# Patient Record
Sex: MALE | Race: WHITE | NOT HISPANIC OR LATINO | Employment: FULL TIME | URBAN - METROPOLITAN AREA
[De-identification: names, ages, dates, MRNs, and addresses within clinical notes are randomized per-mention and may not be internally consistent; named-entity substitution may affect disease eponyms.]

---

## 2022-09-26 ENCOUNTER — OCCUPATIONAL HEALTH (OUTPATIENT)
Dept: URGENT CARE | Facility: CLINIC | Age: 57
End: 2022-09-26

## 2022-09-26 DIAGNOSIS — Z13.9 ENCOUNTER FOR SCREENING: Primary | ICD-10-CM

## 2022-09-26 LAB
CTP QC/QA: YES
POC 10 PANEL DRUG SCREEN: NEGATIVE

## 2022-09-26 PROCEDURE — 80305 DRUG TEST PRSMV DIR OPT OBS: CPT | Mod: S$GLB,,, | Performed by: EMERGENCY MEDICINE

## 2022-09-26 PROCEDURE — 80305 POCT RAPID DRUG SCREEN 10 PANEL: ICD-10-PCS | Mod: S$GLB,,, | Performed by: EMERGENCY MEDICINE

## 2023-11-03 ENCOUNTER — PATIENT MESSAGE (OUTPATIENT)
Dept: UROLOGY | Facility: CLINIC | Age: 58
End: 2023-11-03
Payer: MEDICARE

## 2023-11-15 ENCOUNTER — OFFICE VISIT (OUTPATIENT)
Dept: UROLOGY | Facility: CLINIC | Age: 58
End: 2023-11-15
Payer: MEDICARE

## 2023-11-15 ENCOUNTER — HOSPITAL ENCOUNTER (OUTPATIENT)
Dept: RADIOLOGY | Facility: HOSPITAL | Age: 58
Discharge: HOME | End: 2023-11-15
Attending: UROLOGY
Payer: MEDICARE

## 2023-11-15 VITALS — BODY MASS INDEX: 23.47 KG/M2 | WEIGHT: 158.5 LBS | HEIGHT: 69 IN

## 2023-11-15 DIAGNOSIS — N13.8 ENLARGED PROSTATE WITH URINARY OBSTRUCTION: ICD-10-CM

## 2023-11-15 DIAGNOSIS — R10.9 FLANK PAIN: ICD-10-CM

## 2023-11-15 DIAGNOSIS — R97.20 ELEVATED PSA: Primary | ICD-10-CM

## 2023-11-15 DIAGNOSIS — N40.1 ENLARGED PROSTATE WITH URINARY OBSTRUCTION: ICD-10-CM

## 2023-11-15 LAB
BILIRUBIN, UA POC OHS: NEGATIVE
BLOOD, UA POC OHS: NEGATIVE
CLARITY, UA POC OHS: CLEAR
COLOR, UA POC OHS: YELLOW
GLUCOSE, UA POC OHS: NEGATIVE
KETONES, UA POC OHS: NEGATIVE
LEUKOCYTES, UA POC OHS: NEGATIVE
NITRITE, UA POC OHS: NEGATIVE
PH, UA POC OHS: 6.5
PROTEIN, UA POC OHS: NEGATIVE
SPECIFIC GRAVITY, UA POC OHS: 1.01
UROBILINOGEN, UA POC OHS: 0.2

## 2023-11-15 PROCEDURE — 81003 URINALYSIS AUTO W/O SCOPE: CPT | Mod: PBBFAC,PO | Performed by: UROLOGY

## 2023-11-15 PROCEDURE — 99999PBSHW POCT URINALYSIS(INSTRUMENT): ICD-10-PCS | Mod: PBBFAC,,,

## 2023-11-15 PROCEDURE — 99213 OFFICE O/P EST LOW 20 MIN: CPT | Mod: PBBFAC,25,PO | Performed by: UROLOGY

## 2023-11-15 PROCEDURE — 76770 US EXAM ABDO BACK WALL COMP: CPT | Mod: TC,PO

## 2023-11-15 PROCEDURE — 99999PBSHW POCT URINALYSIS(INSTRUMENT): Mod: PBBFAC,,,

## 2023-11-15 PROCEDURE — 99204 OFFICE O/P NEW MOD 45 MIN: CPT | Mod: S$PBB,,, | Performed by: UROLOGY

## 2023-11-15 PROCEDURE — 76770 US RETROPERITONEAL COMPLETE: ICD-10-PCS | Mod: 26,,, | Performed by: RADIOLOGY

## 2023-11-15 PROCEDURE — 99999 PR PBB SHADOW E&M-EST. PATIENT-LVL III: CPT | Mod: PBBFAC,,, | Performed by: UROLOGY

## 2023-11-15 PROCEDURE — 99999 PR PBB SHADOW E&M-EST. PATIENT-LVL III: ICD-10-PCS | Mod: PBBFAC,,, | Performed by: UROLOGY

## 2023-11-15 PROCEDURE — 99204 PR OFFICE/OUTPT VISIT, NEW, LEVL IV, 45-59 MIN: ICD-10-PCS | Mod: S$PBB,,, | Performed by: UROLOGY

## 2023-11-15 PROCEDURE — 76770 US EXAM ABDO BACK WALL COMP: CPT | Mod: 26,,, | Performed by: RADIOLOGY

## 2023-11-15 RX ORDER — TAMSULOSIN HYDROCHLORIDE 0.4 MG/1
1 CAPSULE ORAL DAILY
COMMUNITY
Start: 2023-10-27

## 2023-11-15 RX ORDER — PHENELZINE SULFATE 15 MG/1
15 TABLET ORAL EVERY OTHER DAY
COMMUNITY
Start: 2023-11-03

## 2023-11-15 NOTE — PROGRESS NOTES
Subjective:       Patient ID: Ronald Martinez is a 58 y.o. male.    Chief Complaint: Elevated PSA    HPI    58-year-old with elevated PSA.  His PSA is increased to 64.9.  He says in 2019 his last PSA was in the 2-3 range.  He has no family history of prostate cancer.  He has moderate obstructive urinary symptoms including urinary frequency and weak flow.  He denies hematuria and dysuria.  He was recently given a prescription for Flomax which he has not yet started.  He has had blood in his semen on occasion.  He has no previous urinary tract infections.  Urinalysis today is clear.  He has been having left-sided flank pain for the last 2 weeks.  He denies associated nausea.  He is concerned about kidney stones.  He is not had any previous stones but 2 brothers have stones.  We discussed screening PSA and its limitation.  We discussed the possibility of prostate cancer.  We discussed prostate needle biopsy.  We discussed prostate MRI.  Urine dipstick shows negative for all components.      Prostate Specific Antigen   Latest Ref Rng 0.00 - 4.00 ng/mL   10/20/2023 64.9 (H)      No past medical history on file.    No past surgical history on file.      Current Outpatient Medications:     phenelzine (NARDIL) 15 mg tablet, Take 15 mg by mouth every other day., Disp: , Rfl:     tamsulosin (FLOMAX) 0.4 mg Cap, Take 1 capsule by mouth once daily., Disp: , Rfl:       Review of Systems   Constitutional:  Negative for fever.   Genitourinary:  Negative for dysuria and hematuria.       Objective:      Physical Exam  Vitals reviewed.   Constitutional:       Appearance: He is well-developed.   Pulmonary:      Effort: Pulmonary effort is normal.   Genitourinary:     Penis: Normal.       Testes: Normal.      Epididymis:      Right: Normal.      Left: Normal.      Prostate: Enlarged (>60g, irregular.). No nodules present.   Skin:     Findings: No rash.   Neurological:      Mental Status: He is alert and oriented to person, place, and time.          Assessment:       1. Elevated PSA    2. Flank pain    3. Enlarged prostate with urinary obstruction        Plan:       Elevated PSA  -     POCT Urinalysis(Instrument)  -     MRI Prostate W W/O Contrast; Future; Expected date: 11/15/2023    Flank pain  -     US Retroperitoneal Complete; Future; Expected date: 11/15/2023    Enlarged prostate with urinary obstruction      Kidney ultrasound was reviewed.  There is no stones or obstruction.  Prostate is enlarged at 58 cc.

## 2024-09-05 DIAGNOSIS — R97.20 ELEVATED PSA: Primary | ICD-10-CM

## 2024-09-06 ENCOUNTER — HOSPITAL ENCOUNTER (OUTPATIENT)
Dept: RADIOLOGY | Facility: HOSPITAL | Age: 59
Discharge: HOME | End: 2024-09-06
Attending: UROLOGY
Payer: MEDICARE

## 2024-09-06 DIAGNOSIS — R97.20 ELEVATED PSA: ICD-10-CM

## 2024-09-06 PROCEDURE — 72197 MRI PELVIS W/O & W/DYE: CPT | Mod: TC,PO

## 2024-09-06 PROCEDURE — 72197 MRI PELVIS W/O & W/DYE: CPT | Mod: 26,,, | Performed by: STUDENT IN AN ORGANIZED HEALTH CARE EDUCATION/TRAINING PROGRAM

## 2024-09-06 RX ORDER — GADOBUTROL 604.72 MG/ML
7 INJECTION INTRAVENOUS
Status: DISCONTINUED | OUTPATIENT
Start: 2024-09-06 | End: 2024-09-07 | Stop reason: HOSPADM

## 2024-09-09 ENCOUNTER — TELEPHONE (OUTPATIENT)
Dept: UROLOGY | Facility: CLINIC | Age: 59
End: 2024-09-09
Payer: MEDICARE

## 2024-09-09 NOTE — TELEPHONE ENCOUNTER
----- Message from Marianela Adams sent at 9/9/2024  1:32 PM CDT -----  Contact: self  Type:  Needs Medical Advice    Who Called: self  Symptoms (please be specific): pt needs a CPT code for upcoming Pet scan   Would the patient rather a call back or a response via [facility]? call  Best Call Back Number: [phone] (home)     Additional Information: please advise and thank you.

## 2024-09-09 NOTE — TELEPHONE ENCOUNTER
Called pt back in regards to previous message about a PET scan. PT was asked how can we assist him and pt got loud and stated well don't you have the message in front of you. Pt was asked to not be disrespectful to staff as they are trying to assist pt with what is needed. So pt was asked again as to what he is needing as pt is not scheduled for a PET scan and that is normally where code is linked too. PT got loud with staff again and was being disrespectful so call was disconnected.

## 2024-09-10 ENCOUNTER — PATIENT MESSAGE (OUTPATIENT)
Dept: UROLOGY | Facility: CLINIC | Age: 59
End: 2024-09-10
Payer: MEDICARE

## 2024-09-10 RX ORDER — LEVOFLOXACIN 500 MG/1
500 TABLET, FILM COATED ORAL DAILY
Qty: 3 TABLET | Refills: 0 | Status: SHIPPED | OUTPATIENT
Start: 2024-09-10

## 2024-09-13 DIAGNOSIS — R97.20 ELEVATED PSA: Primary | ICD-10-CM

## 2024-09-16 ENCOUNTER — OFFICE VISIT (OUTPATIENT)
Dept: UROLOGY | Facility: CLINIC | Age: 59
End: 2024-09-16
Payer: MEDICARE

## 2024-09-16 DIAGNOSIS — R97.20 ELEVATED PSA: Primary | ICD-10-CM

## 2024-09-16 PROCEDURE — 99214 OFFICE O/P EST MOD 30 MIN: CPT | Mod: 95,,, | Performed by: UROLOGY

## 2024-09-16 NOTE — PROGRESS NOTES
The patient location is:  Home  The chief complaint leading to consultation is:  Elevated PSA    Visit type: audiovisual    Face to Face time with patient: 10  20 minutes of total time spent on the encounter, which includes face to face time and non-face to face time preparing to see the patient (eg, review of tests), Obtaining and/or reviewing separately obtained history, Documenting clinical information in the electronic or other health record, Independently interpreting results (not separately reported) and communicating results to the patient/family/caregiver, or Care coordination (not separately reported).       Each patient to whom he or she provides medical services by telemedicine is:  (1) informed of the relationship between the physician and patient and the respective role of any other health care provider with respect to management of the patient; and (2) notified that he or she may decline to receive medical services by telemedicine and may withdraw from such care at any time.      Subjective:       Patient ID: Ronald Martinez is a 59 y.o. male.    Chief Complaint: No chief complaint on file.    HPI    59-year-old with elevated PSA of 64.9.  He underwent prostate MRI which noted a PiRADS 5 lesion.  Prostate volume was 57 mL.  MRI also noted concerning lymphadenopathy as well as bony lesions in the pubic symphysis and pubic rami concerning for osseous metastasis.  The lesion appears to extend to the seminal vesicles as well as into the bladder base.  I recommend prostate needle biopsy using Uronav guidance.  I explained the procedure in detail including the risk of infection.     Prostate Specific Antigen   Latest Ref Rng 0.00 - 4.00 ng/mL   10/20/2023 64.9 (H)       Legend:  (H) High    Review of Systems   Constitutional:  Negative for fever.   Genitourinary:  Negative for dysuria and hematuria.       Objective:      Physical Exam  Constitutional:       General: He is not in acute distress.  Neurological:       Mental Status: He is alert and oriented to person, place, and time.         Assessment:       1. Elevated PSA        Plan:       Elevated PSA      We will proceed with prostate needle biopsy using Uronav it guidance as scheduled.     I personally reviewed the MRI images and made an independent interpretation of the test completed by another healthcare professional.

## 2024-09-17 ENCOUNTER — TELEPHONE (OUTPATIENT)
Dept: UROLOGY | Facility: CLINIC | Age: 59
End: 2024-09-17
Payer: MEDICARE

## 2024-09-17 RX ORDER — CITALOPRAM 10 MG/1
5 TABLET ORAL
COMMUNITY
Start: 2024-04-19

## 2024-09-20 ENCOUNTER — TELEPHONE (OUTPATIENT)
Dept: UROLOGY | Facility: CLINIC | Age: 59
End: 2024-09-20
Payer: MEDICARE

## 2024-09-20 NOTE — TELEPHONE ENCOUNTER
Spoke with Dr Mariano office, re faxed clearance in preparation for patient procedure scheduled on 9/27/24

## 2024-09-20 NOTE — TELEPHONE ENCOUNTER
----- Message from Sejal Murry MA sent at 9/20/2024  2:06 PM CDT -----  Contact: PT    ----- Message -----  From: Lola Morejon  Sent: 9/20/2024   1:31 PM CDT  To: ANDREA Haas Staff [facility]    Type:  Apoointment TIME Request    Name of Caller:PT   When is the first available appointment?PT SCHEDULED FOR 09/27/24  Would the patient rather a call back or a response via [facility]? CALL   Best Call Back Number: [phone]  Additional Information: PT NEEDS THE TIME OF HIS PROCEDURE ASAP. PT NEEDS TO [facility] A RIDE ONE WEEK IN ADVANCE.  THANK YOU

## 2024-09-20 NOTE — TELEPHONE ENCOUNTER
Spoke with patient , advised him that a nurse will call him the day before his scheduled procedure and give him an arrival time. Patient states his brother will take him so he does have a ride to procedure. Patient expressed understanding..

## 2024-09-23 ENCOUNTER — TELEPHONE (OUTPATIENT)
Dept: UROLOGY | Facility: CLINIC | Age: 59
End: 2024-09-23
Payer: MEDICARE

## 2024-09-23 NOTE — TELEPHONE ENCOUNTER
Spoke with Mercedes in Dr Mariano office , she will have Dr Mariano know about patient upcoming biopsy and fax our office clearance back .

## 2024-09-26 PROBLEM — R97.20 ELEVATED PSA: Status: ACTIVE | Noted: 2024-09-26

## 2024-10-01 DIAGNOSIS — C61 PROSTATE CANCER: Primary | ICD-10-CM

## 2024-10-04 ENCOUNTER — HOSPITAL ENCOUNTER (OUTPATIENT)
Dept: RADIOLOGY | Facility: HOSPITAL | Age: 59
Discharge: HOME | End: 2024-10-04
Attending: UROLOGY
Payer: MEDICARE

## 2024-10-04 DIAGNOSIS — C61 PROSTATE CANCER: ICD-10-CM

## 2024-10-04 PROCEDURE — 78815 PET IMAGE W/CT SKULL-THIGH: CPT | Mod: 26,PI,, | Performed by: STUDENT IN AN ORGANIZED HEALTH CARE EDUCATION/TRAINING PROGRAM

## 2024-10-04 PROCEDURE — A9595 HC PIFLUFOLASTAT F-18, DX, PER 1 MCI: HCPCS | Mod: TB,PN | Performed by: UROLOGY

## 2024-10-04 PROCEDURE — 78815 PET IMAGE W/CT SKULL-THIGH: CPT | Mod: TC,PN

## 2024-10-04 RX ADMIN — PIFLUFOLASTAT F-18 9.9 MILLICURIE: 80 INJECTION INTRAVENOUS at 03:10

## 2024-10-04 NOTE — PROGRESS NOTES
PET Imaging Questionnaire    Are you a Diabetic? Recent Blood Sugar level? No    Are you anemic? Bone Marrow Stimulation Meds? No    Have you had a CT Scan, if so when & where was your last one? No    Have you had a PET Scan, if so when & where was your last one? No    Chemotherapy or currently on Chemotherapy? No    Radiation therapy? No    Surgical History:   Past Surgical History:   Procedure Laterality Date    CYST REMOVAL      TRANSRECTAL BIOPSY OF PROSTATE WITH ULTRASOUND GUIDANCE N/A 9/27/2024    Procedure: BIOPSY, PROSTATE, RECTAL APPROACH, WITH US GUIDANCE;  Surgeon: ANDREA Haas MD;  Location: [facility];  Service: Urology;  Laterality: N/A;        Have you been fasting for at least 6 hours? No    Is there any chance you may be pregnant or breastfeeding? No    Assay: 10.0 MCi@:15:27   Injection Site:RT AC    Residual: 0.1 mCi@: 15:31   Technologist: Rory Schmid Injected:9.9 mCi

## 2024-10-07 ENCOUNTER — TELEPHONE (OUTPATIENT)
Dept: HEMATOLOGY/ONCOLOGY | Facility: CLINIC | Age: 59
End: 2024-10-07
Payer: MEDICARE

## 2024-10-07 NOTE — NURSING
Spoke with patient, appointment times adjusted to avoid long wait times.  Pt verbalized agreement to new times/date/location. All questions and concerns addressed at this time, will continue to follow.

## 2024-10-17 NOTE — PROGRESS NOTES
Subjective:       Patient ID: Ronald Martinez is a 59 y.o. male.    Chief Complaint: Prostate Cancer (Elevated PSA)    HPI    59-year-old elevated PSA of 64.9. MRI noted a PiRADS 5 lesion. Prostate volume was 57 mL. MRI also noted bony lesions concerning for metastasis in the pubic rami and pubic symphysis. He is underwent prostate needle biopsy using US guidance.  All 12 cores are positive with highest grade Lindsey 4 + 5, grade group 5.  PET scan and MRI show evidence of bony metastasis as well as metastatic pelvic lymph nodes.  He is seen today alone.  We discussed all treatment options for advanced prostate cancer.  I answered all questions to his satisfaction.      PSA  64.9  MRI volume 57 ml  Risk Group  Very High  IPSS  29      1. LEFT BASE PROSTATE CORE NEEDLE BIOPSIES:   - PROSTATIC ADENOCARCINOMA, LINDSEY SCORE 4+4=8, ISUP/WHO GRADE GROUP 4,    INVOLVING 80% (10 MM) OF ONE CORE AND 85% (14 MM) OF THE SECOND CORE.    CRIBRIFORM PATTERN AND PERINEURAL INVASION PRESENT.     2. LEFT MID-PROSTATE CORE NEEDLE BIOPSIES:   - PROSTATIC ADENOCARCINOMA, LINDSEY SCORE 4+4=8, ISUP/WHO GRADE GROUP 4,    INVOLVING 100% (20 MM) OF ONE CORE % (20 MM) OF THE SECOND CORE.    CRIBRIFORM PATTERN AND PERINEURAL INVASION PRESENT.     3. LEFT APEX PROSTATE CORE NEEDLE BIOPSIES:   - PROSTATIC ADENOCARCINOMA, LINDSEY SCORE 4+4=8, ISUP/WHO GRADE GROUP 4,    INVOLVING 65% (10 MM) OF ONE CORE AND 75% (10 MM) OF THE SECOND CORE.    CRIBRIFORM PATTERN PRESENT.     4. RIGHT BASE PROSTATE CORE NEEDLE BIOPSIES:   - PROSTATIC ADENOCARCINOMA, LINDSEY SCORE 4+5=9, ISUP/WHO GRADE GROUP 5,    INVOLVING 100% (18 MM) OFONE CORE AND 75% (14 MM) OF THE SECOND CORE.    CRIBRIFORM PATTERN AND PERINEURAL INVASION PRESENT.     5. RIGHT MID-PROSTATE CORE NEEDLE BIOPSIES:   - PROSTATIC ADENOCARCINOMA, LINDSEY SCORE 4+5=9, ISUP/WHO GRADE GROUP 5,    INVOLVING 100% (24 MM) OF ONE CORE AND 75% (15 MM) OF THE SECOND CORE.    CRIBRIFORM PATTERN PRESENT.      6. RIGHT APEX PROSTATE CORE NEEDLE BIOPSIES:   - PROSTATIC ADENOCARCINOMA, DEONNA SCORE 4+5=9, ISUP/WHO GRADE GROUP 5,    INVOLVING 85% (17 MM) OF ONE CORE   AND 25% (5 MM) OF THE SECOND CORE. CRIBRIFORM PATTERN PRESENT.     Review of Systems   Constitutional:  Negative for fever.   Genitourinary:  Negative for dysuria and hematuria.       Objective:      Physical Exam  Vitals reviewed.   Constitutional:       Appearance: He is well-developed.   Pulmonary:      Effort: Pulmonary effort is normal.   Skin:     Findings: No rash.   Neurological:      Mental Status: He is alert and oriented to person, place, and time.         Assessment:       1. Prostate cancer        Plan:       Prostate cancer      Advanced prostate cancer.  Case discussed with Dr. Hills and María.    Primary ADT,  abiraterone and prednisone along with Lupron

## 2024-10-17 NOTE — PROGRESS NOTES
PATIENT: Ronald Martinez  MRN: [id number]  DATE: 10/20/2024      Diagnosis:   1. Prostate cancer        Chief Complaint: Prostate Cancer (Elevated PSA)      Oncologic History:      Oncologic History     Oncologic Treatment     Pathology           Subjective:    Initial History: Mr. Martinez is a 59 y.o. male presets for metastatic prostate cancer.  PSA on 10/20/2023 was 64.9 ng/mL.  The patient underwent MRI prostate 09/06/2024 showing a large lesion involving the majority of the prostate gland.  The patient underwent prostate biopsy on 09/27/2024 showing prostatic adenocarcinoma Lindsey score 4+5=9 grade group 5.  Patient underwent PSMA PET-CT on 10/04/2024 showing markedly enlarged prostate gland with diffuse radiotracer uptake; metastatic right pelvic lymph node measuring 2.8 x 1.4 cm; left internal iliac node measuring 1.9 x 1.2 cm; left external iliac lymph node measuring 2.5 x 1.5 cm; infiltrative lesion in the right inferior pubic ramus extending into the pubic symphysis; nodular focus of radiotracer accumulation in the left inferior pubic ramus; punctate focus of radiotracer accumulation in the right iliac bone; nodular focus of radiotracer accumulation within the left lamina of S1.    Currently the patient endorses weight loss in a year.  The patient endorses migratory pain in the right thigh.  The patient endorses occasional abdominal pain, frequent bowel movements, frequent urination, arthritis in the hips and back.  The patient denies CP, cough, SOB, abdominal pain, nausea, vomiting, constipation, diarrhea.  The patient denies fever, chills, night sweats, weight loss, new lumps or bumps, easy bruising or bleeding.    Past Medical History:   Past Medical History:   Diagnosis Date    Cancer     prostate       Past Surgical HIstory:   Past Surgical History:   Procedure Laterality Date    CYST REMOVAL      TRANSRECTAL BIOPSY OF PROSTATE WITH ULTRASOUND GUIDANCE N/A 9/27/2024    Procedure: BIOPSY, PROSTATE, RECTAL  APPROACH, WITH US GUIDANCE;  Surgeon: ANDREA Haas MD;  Location: [facility];  Service: Urology;  Laterality: N/A;       Family History:   Family History   Problem Relation Name Age of Onset    Atrial fibrillation Mother      Deep vein thrombosis Father      Heart attack Father      Kidney nephrosis Brother         Social History:  reports that he quit smoking about 49 years ago. His smoking use included cigarettes. He started smoking about 43 years ago. He has never used smokeless tobacco. He reports that he does not currently use alcohol after a past usage of about 7.0 standard drinks of alcohol per week. He reports that he does not currently use drugs.    Allergies:  Review of patient's allergies indicates:  No Known Allergies    Medications:  Current Outpatient Medications   Medication Sig Dispense Refill    abiraterone (ZYTIGA) 500 mg Tab Take 2 tablets (1,000 mg total) by mouth once daily. 60 tablet 6    predniSONE (DELTASONE) 5 MG tablet Take 1 tablet (5 mg total) by mouth once daily. 30 tablet 6     No current facility-administered medications for this visit.       Review of Systems   Constitutional:  Positive for unexpected weight change (10 pounds in a year). Negative for appetite change, chills and fever.   HENT:  Negative for mouth sores, sore throat and trouble swallowing.    Eyes:  Negative for photophobia and visual disturbance.   Respiratory:  Negative for cough, chest tightness and shortness of breath.    Cardiovascular:  Negative for chest pain, palpitations and leg swelling.   Gastrointestinal:  Positive for abdominal pain. Negative for constipation, diarrhea, nausea and vomiting.        Frequent bowel movements   Endocrine: Negative for cold intolerance and heat intolerance.   Genitourinary:  Positive for frequency. Negative for difficulty urinating, dysuria and hematuria.   Musculoskeletal:  Positive for arthralgias (hips) and back pain. Negative for myalgias.   Skin:  Negative for color  "change and rash.   Neurological:  Negative for dizziness, light-headedness, numbness and headaches.   Hematological:  Negative for adenopathy. Does not bruise/bleed easily.   Psychiatric/Behavioral:  The patient is not nervous/anxious.        ECOG Performance Status: 1   Objective:      Vitals:   Vitals:    10/18/24 1316   BP: 137/81   BP Location: Right arm   Patient Position: Sitting   Pulse: 62   Resp: 17   Temp: 97.1 °F (36.2 °C)   TempSrc: Temporal   SpO2: 99%   Weight: 70.9 kg (156 lb 4.9 oz)   Height: 5' 8.5" (1.74 m)       Physical Exam  Constitutional:       General: He is not in acute distress.     Appearance: He is well-developed. He is not diaphoretic.   HENT:      Head: Normocephalic and atraumatic.   Eyes:      General: No scleral icterus.        Right eye: No discharge.         Left eye: No discharge.   Cardiovascular:      Rate and Rhythm: Normal rate and regular rhythm.      Heart sounds: Normal heart sounds. No murmur heard.     No friction rub. No gallop.   Pulmonary:      Effort: Pulmonary effort is normal. No respiratory distress.      Breath sounds: Normal breath sounds. No wheezing or rales.   Chest:      Chest wall: No tenderness.   Abdominal:      General: Bowel sounds are normal. There is no distension.      Palpations: Abdomen is soft. There is no mass.      Tenderness: There is no abdominal tenderness. There is no rebound.   Skin:     General: Skin is warm and dry.      Findings: No erythema or rash.   Neurological:      Mental Status: He is alert and oriented to person, place, and time.   Psychiatric:         Behavior: Behavior normal.         Laboratory Data:  Lab Visit on 10/18/2024   Component Date Value Ref Range Status    WBC 10/18/2024 5.29  3.90 - 12.70 K/uL Final    RBC 10/18/2024 4.47 (L)  4.60 - 6.20 M/uL Final    Hemoglobin 10/18/2024 13.7 (L)  14.0 - 18.0 g/dL Final    Hematocrit 10/18/2024 40.2  40.0 - 54.0 % Final    MCV 10/18/2024 90  82 - 98 fL Final    MCH 10/18/2024 " 30.6  27.0 - 31.0 pg Final    MCHC 10/18/2024 34.1  32.0 - 36.0 g/dL Final    RDW 10/18/2024 11.9  11.5 - 14.5 % Final    Platelets 10/18/2024 246  150 - 450 K/uL Final    MPV 10/18/2024 9.6  9.2 - 12.9 fL Final    Immature Granulocytes 10/18/2024 0.2  0.0 - 0.5 % Final    Gran # (ANC) 10/18/2024 3.0  1.8 - 7.7 K/uL Final    Immature Grans (Abs) 10/18/2024 0.01  0.00 - 0.04 K/uL Final    Comment: Mild elevation in immature granulocytes is non specific and   can be seen in a variety of conditions including stress response,   acute inflammation, trauma and pregnancy. Correlation with other   laboratory and clinical findings is essential.      Lymph # 10/18/2024 1.7  1.0 - 4.8 K/uL Final    Mono # 10/18/2024 0.5  0.3 - 1.0 K/uL Final    Eos # 10/18/2024 0.1  0.0 - 0.5 K/uL Final    Baso # 10/18/2024 0.04  0.00 - 0.20 K/uL Final    nRBC 10/18/2024 0  0 /100 WBC Final    Gran % 10/18/2024 56.6  38.0 - 73.0 % Final    Lymph % 10/18/2024 31.6  18.0 - 48.0 % Final    Mono % 10/18/2024 9.5  4.0 - 15.0 % Final    Eosinophil % 10/18/2024 1.3  0.0 - 8.0 % Final    Basophil % 10/18/2024 0.8  0.0 - 1.9 % Final    Differential Method 10/18/2024 Automated   Final    Sodium 10/18/2024 141  136 - 145 mmol/L Final    Potassium 10/18/2024 4.0  3.5 - 5.1 mmol/L Final    Chloride 10/18/2024 106  95 - 110 mmol/L Final    CO2 10/18/2024 26  23 - 29 mmol/L Final    Glucose 10/18/2024 89  70 - 110 mg/dL Final    BUN 10/18/2024 12  8 - 23 mg/dL Final    Creatinine 10/18/2024 0.7  0.5 - 1.4 mg/dL Final    Calcium 10/18/2024 9.1  8.7 - 10.5 mg/dL Final    Total Protein 10/18/2024 6.9  6.0 - 8.4 g/dL Final    Albumin 10/18/2024 4.1  3.5 - 5.2 g/dL Final    Total Bilirubin 10/18/2024 0.5  0.1 - 1.0 mg/dL Final    Comment: For infants and newborns, interpretation of results should be based  on gestational age, weight and in agreement with clinical  observations.    Premature Infant recommended reference ranges:  Up to 24 hours.............<8.0  mg/dL  Up to 48 hours............<12.0 mg/dL  3-5 days..................<15.0 mg/dL  6-29 days.................<15.0 mg/dL      Alkaline Phosphatase 10/18/2024 54  40 - 150 U/L Final    AST 10/18/2024 20  10 - 40 U/L Final    ALT 10/18/2024 20  10 - 44 U/L Final    eGFR 10/18/2024 >60.0  >60 mL/min/1.73 m^2 Final    Anion Gap 10/18/2024 9  8 - 16 mmol/L Final    PSA Diagnostic 10/18/2024 213.0 (H)  0.00 - 4.00 ng/mL Final    Comment: The testing method is a chemiluminescent microparticle immunoassay   manufactured by [employer] and performed on the [employer]   or   [employer]al[employer] system. Values obtained with different assay manufacturers   for   methods may be different and cannot be used interchangeably.  PSA Expected levels:  Hormonal Therapy: <0.05 ng/ml  Prostatectomy: <0.01 ng/ml  Radiation Therapy: <1.00 ng/ml           Imaging:     MRI Prostate 9/06/24  Image quality is degraded by motion artifact, limiting assessment. There is significant motion artifact on small field of view axial T2 sequence which is used to segment the prostate gland on [employer].     Prostate: 5.0 x 5.0 x 5.9 cm corresponding to a computed volume of 57 cc.     Peripheral/transition zone: Large PI-RADS 5 lesion involving the majority of the prostate gland centered in the base and mid gland however and also involves the apex. Lesion involves both the transition and peripheral zones. There is extracapsular extension, and involvement of local structures as detailed below.     Lesion (KHADIJAH) #T-1     Greatest dimension: 5.5 cm (series 5, image 16)     T2-WI: Lenticular or non-circumscribed, homogeneous,moderately hypointense, and >1.5 cm in greatest dimension, score 5.     DWI/ADC: Same as 4 but ?1.5 cm in greatest dimension or definite extraprostatic extension/invasive behavior, score 5.     DCE: Positive     Extraprostatic extension: Positive     PI-RADS assessment category: 5     Neurovascular bundle: Suspected involvement, however  motion artifact obscures this region limiting assessment.     Seminal vesicles: Abnormal restricted diffusion/tumor signal extends to involve the seminal vesicles.     Adjacent Organ Involvement: Prostate lesion appears to invade through the base of the bladder extending into the bladder lumen (series 2, image 22).     Lymphadenopathy: Multiple enlarged bilateral iliac chain lymph nodes concerning for metastasis. Right external iliac chain lymph node measures 2.1 cm (series 3, image 14). Left external iliac chain lymph node measures 2 cm (series 3, image 12). Left internal iliac chain lymph node measures 1.5 cm (series 3, image 12).     Other Findings: Multiple marrow replacing, enhancing lesions involving the right pubic symphysis and inferior pubic ramus concerning for metastasis. Mild diffuse bladder wall thickening with trabeculation and multiple small diverticula, likely sequela of chronic outlet obstruction.    PSMA PET/CT 10/04/24  Physiologic distribution of tracer within the salivary and lacrimal glands, blood pool, liver, spleen, pancreas, ganglia, bone marrow, bowel, kidneys, and urinary tract.     Prostate bed: The prostate gland is markedly enlarged and demonstrates diffuse radiotracer uptake with an SUV max of 58.     Lymph nodes: There is a metastatic right pelvic lymph node best visualized on image 268 which measures 2.8 x 1.4 cm with an SUV max of 55. There is a left internal iliac node measuring 1.9 x 1.2 cm with an SUV max of 41. There is a left external iliac node measuring 2.5 x 1.8 cm best visualized on image 258 with an SUV max of 46.     Skeleton: There is an infiltrative lesion in the right inferior pubic ramus extending into the pubic symphysis with an SUV max of 65. There is a nodular focus of radiotracer accumulation in the left inferior pubic ramus best visualized on image 291 with an SUV max of 12. There is a punctate focus of radiotracer accumulation in the posterior right iliac bone  best visualized on image 255 with an SUV max of 3.3. There is a nodular focus of radiotracer accumulation within the left lamina of S1 best visualized on image 250 with an SUV max of 3.6.       Assessment:       1. Prostate cancer           Plan:     Prostate Cancer - the patient with metastatic castrate sensitive prostate cancer with lesions in pelvic lymph nodes, right inferior veer pubic ramus, left inferior pubic ramus, right iliac bone, and lamina of S1  -The patient does not have high volume disease outside of the pelvis in his a candidate for abiraterone and prednisone along with Lupron  -The patient is started on abiraterone for 2 weeks followed by initiation of Lupron therapy  -Patient given information on abiraterone and Lupron  -Will repeat PSMA PET-CT 6 months after initiation of therapy to assess ability to add radiation treatment  -PSA to be repeated today   -We will do Invitae blood testing for germline mutations   -Tempus blood and tissue testing to be completed  Visit today included increased complexity associated with the care of the episodic problem (ADT and Abiratoerone/prednisone ) addressed and managing the longitudinal care of the patient due to the serious and/or complex managed problem(s) prostate cancer.      Route Chart for Scheduling    Med Onc Chart Routing      Follow up with physician Other. Pt needs a CBC, CMP, PSA, Ivitae blood test and TEMPUS blood test today.  Pt needs approval for lupron and abiraterone.  Pt needs an appt with me after he has been on abiraterone for 2 weeks with CBC, CMP and lupron that day.   Follow up with FELIPE    Infusion scheduling note    Injection scheduling note    Labs    Imaging    Pharmacy appointment    Other referrals                  Supportive Plan Information  OP PROSTATE LEUPROLIDE Q3MO Jose Alberto Daniel MD   Associated Diagnosis: Prostate cancer Stage IVB cTX, cN1, pM1b, Grade Group: 5 noted on 10/18/2024   Line of treatment: Supportive Care    Treatment goal: Palliative     Upcoming Treatment Dates - OP PROSTATE LEUPROLIDE Q3MO    11/4/2024       Chemotherapy       leuprolide (LUPRON) injection 22.5 mg  1/27/2025       Chemotherapy       leuprolide (LUPRON) injection 22.5 mg  4/21/2025       Chemotherapy       leuprolide (LUPRON) injection 22.5 mg  7/14/2025       Chemotherapy       leuprolide (LUPRON) injection 22.5 mg      Jose Alberto Daniel MD  [facility]  Hematology and Oncology  [facility]   [address]   [place] [address]   O: [phone]  F: [phone]

## 2024-10-18 ENCOUNTER — TELEPHONE (OUTPATIENT)
Dept: HEMATOLOGY/ONCOLOGY | Facility: CLINIC | Age: 59
End: 2024-10-18
Payer: MEDICARE

## 2024-10-18 ENCOUNTER — OFFICE VISIT (OUTPATIENT)
Dept: UROLOGY | Facility: CLINIC | Age: 59
End: 2024-10-18
Payer: MEDICARE

## 2024-10-18 ENCOUNTER — OFFICE VISIT (OUTPATIENT)
Dept: RADIATION ONCOLOGY | Facility: CLINIC | Age: 59
End: 2024-10-18
Payer: MEDICARE

## 2024-10-18 ENCOUNTER — LAB VISIT (OUTPATIENT)
Dept: LAB | Facility: HOSPITAL | Age: 59
End: 2024-10-18
Attending: INTERNAL MEDICINE
Payer: MEDICARE

## 2024-10-18 ENCOUNTER — OFFICE VISIT (OUTPATIENT)
Dept: HEMATOLOGY/ONCOLOGY | Facility: CLINIC | Age: 59
End: 2024-10-18
Payer: MEDICARE

## 2024-10-18 VITALS
DIASTOLIC BLOOD PRESSURE: 81 MMHG | HEIGHT: 69 IN | RESPIRATION RATE: 17 BRPM | HEIGHT: 69 IN | TEMPERATURE: 97 F | OXYGEN SATURATION: 99 % | RESPIRATION RATE: 17 BRPM | SYSTOLIC BLOOD PRESSURE: 137 MMHG | WEIGHT: 156.31 LBS | SYSTOLIC BLOOD PRESSURE: 137 MMHG | HEART RATE: 62 BPM | HEART RATE: 62 BPM | HEIGHT: 69 IN | OXYGEN SATURATION: 99 % | TEMPERATURE: 97 F | WEIGHT: 156.31 LBS | TEMPERATURE: 97 F | WEIGHT: 156.31 LBS | BODY MASS INDEX: 23.15 KG/M2 | RESPIRATION RATE: 17 BRPM | HEART RATE: 62 BPM | BODY MASS INDEX: 23.15 KG/M2 | BODY MASS INDEX: 23.15 KG/M2 | SYSTOLIC BLOOD PRESSURE: 137 MMHG | DIASTOLIC BLOOD PRESSURE: 81 MMHG | DIASTOLIC BLOOD PRESSURE: 81 MMHG | OXYGEN SATURATION: 99 %

## 2024-10-18 DIAGNOSIS — C61 PROSTATE CANCER: ICD-10-CM

## 2024-10-18 DIAGNOSIS — C61 MALIGNANT NEOPLASM OF PROSTATE: Primary | ICD-10-CM

## 2024-10-18 DIAGNOSIS — C61 PROSTATE CANCER: Primary | ICD-10-CM

## 2024-10-18 LAB
ALBUMIN SERPL BCP-MCNC: 4.1 G/DL (ref 3.5–5.2)
ALP SERPL-CCNC: 54 U/L (ref 40–150)
ALT SERPL W/O P-5'-P-CCNC: 20 U/L (ref 10–44)
ANION GAP SERPL CALC-SCNC: 9 MMOL/L (ref 8–16)
AST SERPL-CCNC: 20 U/L (ref 10–40)
BASOPHILS # BLD AUTO: 0.04 K/UL (ref 0–0.2)
BASOPHILS NFR BLD: 0.8 % (ref 0–1.9)
BILIRUB SERPL-MCNC: 0.5 MG/DL (ref 0.1–1)
BUN SERPL-MCNC: 12 MG/DL (ref 8–23)
CALCIUM SERPL-MCNC: 9.1 MG/DL (ref 8.7–10.5)
CHLORIDE SERPL-SCNC: 106 MMOL/L (ref 95–110)
CO2 SERPL-SCNC: 26 MMOL/L (ref 23–29)
COMPLEXED PSA SERPL-MCNC: 213 NG/ML (ref 0–4)
CREAT SERPL-MCNC: 0.7 MG/DL (ref 0.5–1.4)
DIFFERENTIAL METHOD BLD: ABNORMAL
EOSINOPHIL # BLD AUTO: 0.1 K/UL (ref 0–0.5)
EOSINOPHIL NFR BLD: 1.3 % (ref 0–8)
ERYTHROCYTE [DISTWIDTH] IN BLOOD BY AUTOMATED COUNT: 11.9 % (ref 11.5–14.5)
EST. GFR  (NO RACE VARIABLE): >60 ML/MIN/1.73 M^2
GLUCOSE SERPL-MCNC: 89 MG/DL (ref 70–110)
HCT VFR BLD AUTO: 40.2 % (ref 40–54)
HGB BLD-MCNC: 13.7 G/DL (ref 14–18)
IMM GRANULOCYTES # BLD AUTO: 0.01 K/UL (ref 0–0.04)
IMM GRANULOCYTES NFR BLD AUTO: 0.2 % (ref 0–0.5)
LYMPHOCYTES # BLD AUTO: 1.7 K/UL (ref 1–4.8)
LYMPHOCYTES NFR BLD: 31.6 % (ref 18–48)
MCH RBC QN AUTO: 30.6 PG (ref 27–31)
MCHC RBC AUTO-ENTMCNC: 34.1 G/DL (ref 32–36)
MCV RBC AUTO: 90 FL (ref 82–98)
MONOCYTES # BLD AUTO: 0.5 K/UL (ref 0.3–1)
MONOCYTES NFR BLD: 9.5 % (ref 4–15)
NEUTROPHILS # BLD AUTO: 3 K/UL (ref 1.8–7.7)
NEUTROPHILS NFR BLD: 56.6 % (ref 38–73)
NRBC BLD-RTO: 0 /100 WBC
PLATELET # BLD AUTO: 246 K/UL (ref 150–450)
PMV BLD AUTO: 9.6 FL (ref 9.2–12.9)
POTASSIUM SERPL-SCNC: 4 MMOL/L (ref 3.5–5.1)
PROT SERPL-MCNC: 6.9 G/DL (ref 6–8.4)
RBC # BLD AUTO: 4.47 M/UL (ref 4.6–6.2)
SODIUM SERPL-SCNC: 141 MMOL/L (ref 136–145)
WBC # BLD AUTO: 5.29 K/UL (ref 3.9–12.7)

## 2024-10-18 PROCEDURE — 99213 OFFICE O/P EST LOW 20 MIN: CPT | Mod: PBBFAC,PN | Performed by: RADIOLOGY

## 2024-10-18 PROCEDURE — 99999 PR PBB SHADOW E&M-EST. PATIENT-LVL III: CPT | Mod: PBBFAC,,, | Performed by: UROLOGY

## 2024-10-18 PROCEDURE — 99213 OFFICE O/P EST LOW 20 MIN: CPT | Mod: PBBFAC,27,PN | Performed by: UROLOGY

## 2024-10-18 PROCEDURE — 99215 OFFICE O/P EST HI 40 MIN: CPT | Mod: S$PBB,,, | Performed by: INTERNAL MEDICINE

## 2024-10-18 PROCEDURE — 99214 OFFICE O/P EST MOD 30 MIN: CPT | Mod: PBBFAC,27,PN | Performed by: INTERNAL MEDICINE

## 2024-10-18 PROCEDURE — 85025 COMPLETE CBC W/AUTO DIFF WBC: CPT | Mod: PN | Performed by: INTERNAL MEDICINE

## 2024-10-18 PROCEDURE — 99999 PR PBB SHADOW E&M-EST. PATIENT-LVL IV: CPT | Mod: PBBFAC,,, | Performed by: INTERNAL MEDICINE

## 2024-10-18 PROCEDURE — 99999 PR PBB SHADOW E&M-EST. PATIENT-LVL III: CPT | Mod: PBBFAC,,, | Performed by: RADIOLOGY

## 2024-10-18 PROCEDURE — 84153 ASSAY OF PSA TOTAL: CPT | Performed by: INTERNAL MEDICINE

## 2024-10-18 PROCEDURE — G2211 COMPLEX E/M VISIT ADD ON: HCPCS | Mod: S$PBB,,, | Performed by: INTERNAL MEDICINE

## 2024-10-18 PROCEDURE — 99215 OFFICE O/P EST HI 40 MIN: CPT | Mod: S$PBB,,, | Performed by: UROLOGY

## 2024-10-18 PROCEDURE — 36415 COLL VENOUS BLD VENIPUNCTURE: CPT | Mod: PN | Performed by: INTERNAL MEDICINE

## 2024-10-18 PROCEDURE — 80053 COMPREHEN METABOLIC PANEL: CPT | Mod: PN | Performed by: INTERNAL MEDICINE

## 2024-10-18 RX ORDER — ABIRATERONE 500 MG/1
1000 TABLET ORAL DAILY
Qty: 60 TABLET | Refills: 6 | Status: ACTIVE | OUTPATIENT
Start: 2024-10-18

## 2024-10-18 RX ORDER — PREDNISONE 5 MG/1
5 TABLET ORAL DAILY
Qty: 30 TABLET | Refills: 6 | Status: ACTIVE | OUTPATIENT
Start: 2024-10-18

## 2024-10-18 NOTE — PROGRESS NOTES
10/18/2024    [facility]  [facility]   Radiation Oncology Consultation    Assessment   This is a 59 y.o. y/o male with Grade Group 4, PSA 64.9, metastatic very high risk adenocarcinoma of the prostate.  He presents today to discuss possible palliative treatment with radiation therapy.        Plan     Treatment options were discussed with the patient including radiation therapy and androgen deprivation.  We discussed the goals of treatment to be palliative.  The risks, benefits, scheduling, alternatives to and rationale of radiation therapy were explained in detail.    Indication, course, and potential toxicities of pelvic radiation therapy reviewed in detail, including but not limited to fatigue, increased frequency, urgency, or pain with urination, increased frequency or urgency of bowel movements, diarrhea, pelvic bone fragility, erectile dysfunction, decreased volume of ejaculate, remote risk of secondary malignancy.   Updated PSA  After this discussion, he elected to proceed with consultation with Dr. Daniel.    [place] radiation therapy for palliation. Will re-evaluate for prostate and/or metastasis directed therapy following systemic therapy  He was given our contact information, and he was told that he could call our clinic at any time if he has any questions or concerns.      Radiation Treatment Details:   No radiation therapy planned at this time  Consider future prostate-directed and/or metastasis-directed therapy depending on response to systemic therapy         Chief Complaint   Patient presents with    Prostate Cancer     Elevated PSA         Oncology History    No history exists.       HPI: The patient is a 59 year old male with a diagnosis of prostate cancer.  He was noted to have an elevated PSA of 64.9 on 10/20/23.  Previous PSA history as follows:  Not available    9/6/24 MRI prostate noted:  Large PI-RADS 5 lesion with extracapsular extension, SVI, suspected invasion through  the bladder wall extending into the lumen  Multiple enlarged pelvic lymph nodes  Multiple osseous lesions suspicious for metastasis    On 24 he underwent TRUS-guided biopsy of the prostate, with pathology as follows:  Group 4 or Group 5 disease in all biopsy sites, , 100% of cores    10/4/24 PSMA/PET:  Multiple bilateral enlarged pelvic nodes  Multiple osseous metastases involving pelvic bones (right inf pubic ramus, left inf pubic ramus, left lamina S1, posterior right iliac bone      Prostate size estimated at 57cc.    He presents today to discuss the potential role of radiation therapy in his cancer care.    IPSS is 29, with +++++frequency, +++++urgency, and nocturia x 3.    He reports not currently sexually active.     History of prior irradiation: No  History of prior systemic anti-cancer therapy: No  History of collagen vascular disease: No  Implanted electronic device (pacer/defib/nerve stimulator): No    Past Medical History:   Diagnosis Date    Cancer     prostate       Past Surgical History:   Procedure Laterality Date    CYST REMOVAL      TRANSRECTAL BIOPSY OF PROSTATE WITH ULTRASOUND GUIDANCE N/A 2024    Procedure: BIOPSY, PROSTATE, RECTAL APPROACH, WITH US GUIDANCE;  Surgeon: ANDREA Haas MD;  Location: [facility];  Service: Urology;  Laterality: N/A;       Social History     Tobacco Use    Smoking status: Former     Types: Cigarettes     Start date:      Quit date:      Years since quittin.8    Smokeless tobacco: Never   Substance Use Topics    Alcohol use: Not Currently     Alcohol/week: 7.0 standard drinks of alcohol     Types: 7 Glasses of wine per week    Drug use: Not Currently     Comment:  last used       Cancer-related family history is not on file.    Current Outpatient Medications on File Prior to Visit   Medication Sig Dispense Refill    [DISCONTINUED] citalopram (CELEXA) 10 MG tablet Take 5 mg by mouth.      [DISCONTINUED] levoFLOXacin (LEVAQUIN) 500 MG tablet  "Take 1 tablet (500 mg total) by mouth once daily. 3 tablet 0     No current facility-administered medications on file prior to visit.       Review of patient's allergies indicates:  No Known Allergies    Review of Systems   Constitutional:  Positive for weight loss. Negative for chills, fever and malaise/fatigue.   Eyes:  Negative for double vision.   Respiratory:  Negative for cough and shortness of breath.    Cardiovascular:  Positive for chest pain (few times per week).   Gastrointestinal:  Positive for diarrhea (frequent BM). Negative for abdominal pain.   Genitourinary:  Positive for dysuria (at tip of penis). Negative for hematuria.   Musculoskeletal:  Positive for back pain (low back) and joint pain (pubic ramus, bilat x 1 day). Negative for neck pain.        Vital Signs: /81 (BP Location: Right forearm, Patient Position: Sitting)   Pulse 62   Temp 97.1 °F (36.2 °C) (Temporal)   Resp 17   Ht 5' 8.5" (1.74 m)   Wt 70.9 kg (156 lb 4.9 oz)   SpO2 99%   BMI 23.42 kg/m²     ECOG Performance Status: 0 - Fully Active    Physical Exam  Vitals and nursing note reviewed.   Constitutional:       General: He is not in acute distress.     Appearance: Normal appearance. He is not ill-appearing or toxic-appearing.   HENT:      Head: Normocephalic and atraumatic.      Nose: Nose normal.   Eyes:      Extraocular Movements: Extraocular movements intact.      Conjunctiva/sclera: Conjunctivae normal.      Pupils: Pupils are equal, round, and reactive to light.   Pulmonary:      Effort: Pulmonary effort is normal.   Musculoskeletal:         General: Normal range of motion.      Cervical back: Normal range of motion and neck supple.   Skin:     General: Skin is warm.   Neurological:      General: No focal deficit present.      Mental Status: He is alert and oriented to person, place, and time.   Psychiatric:         Mood and Affect: Mood normal.         Behavior: Behavior normal.         Thought Content: Thought " content normal.         Judgment: Judgment normal.            Imaging: I have personally reviewed the patient's available images and reports and summarized pertinent findings above in HPI.     Pathology: I have personally reviewed the patient's available pathology and summarized pertinent findings above in HPI.     This case was discussed with Dr. Daniel and Dr. Haas      - Thank you for allowing me to participate in the care of your patient.    Bekah Hills MD

## 2024-10-18 NOTE — NURSING
Mr. Martinez seen today in  [facility] by Dr. Haas, Dr. Hills, and Dr. Daniel. He has elected to proceed with ADT -  new orders for lupron/abiraterone placed today per Dr. Daniel.     He will obtain lab work today including TEMPUS and Invitae. He will f/u with Dr. Daniel 2 weeks after beginning abiraterone with labs and Lupron injection following.    He will f/u with Dr. Hills should he require palliative radiation in the future.      Met with Mr. Martinez and reviewed today's visit. He denies any questions or concerns at this time.     My contact information provided. Encouraged Mr. Martinez to contact navigation / care team with any questions or concerns moving forward.  He verbalized understanding.     Escorted out of clinic to lobby by LORENZO Aguilar MA.

## 2024-10-25 ENCOUNTER — PATIENT MESSAGE (OUTPATIENT)
Dept: HEMATOLOGY/ONCOLOGY | Facility: CLINIC | Age: 59
End: 2024-10-25
Payer: MEDICARE

## 2024-10-25 LAB
PD-L1 BY 22C3 TISS IMSTN DOC: NEGATIVE
TEMPUS PD-L1 (22C3) COMBINED POSITIVE SCORE: <1
TEMPUS PD-L1 (22C3) TUMOR PROPORTION SCORE: <1 %
TEMPUS PORTAL: NORMAL

## 2024-11-10 NOTE — PROGRESS NOTES
PATIENT: Ronald Martinez  MRN: [id number]  DATE: 11/11/2024      Diagnosis:   1. Prostate cancer    2. Musculoskeletal leg pain, right          Chief Complaint: Prostate Cancer (3 week follow up )      Oncologic History:      Oncologic History     Oncologic Treatment     Pathology           Subjective:    Interval History: Mr. Martinez is a 59 y.o. male presets for metastatic prostate cancer.  Since the last clinic visit PSA on 10/18/24 was 213ng/mL.  Invitae returned negative for germline mutations tested.  TEMPUS blood was negative.  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  The patient endorses pain in the right thigh last week which has improved over the past 2-3 days.  The patient denies CP, cough, SOB, abdominal pain, nausea, vomiting, constipation, diarrhea.  The patient denies fever, chills, night sweats, weight loss, new lumps or bumps, easy bruising or bleeding.    Prior History:  PSA on 10/20/2023 was 64.9 ng/mL.  The patient underwent MRI prostate 09/06/2024 showing a large lesion involving the majority of the prostate gland.  The patient underwent prostate biopsy on 09/27/2024 showing prostatic adenocarcinoma Lindsey score 4+5=9 grade group 5.  Patient underwent PSMA PET-CT on 10/04/2024 showing markedly enlarged prostate gland with diffuse radiotracer uptake; metastatic right pelvic lymph node measuring 2.8 x 1.4 cm; left internal iliac node measuring 1.9 x 1.2 cm; left external iliac lymph node measuring 2.5 x 1.5 cm; infiltrative lesion in the right inferior pubic ramus extending into the pubic symphysis; nodular focus of radiotracer accumulation in the left inferior pubic ramus; punctate focus of radiotracer accumulation in the right iliac bone; nodular focus of radiotracer accumulation within the left lamina of S1.    Past Medical History:   Past Medical History:   Diagnosis Date    Cancer     prostate       Past Surgical HIstory:   Past Surgical History:   Procedure Laterality Date     CYST REMOVAL      TRANSRECTAL BIOPSY OF PROSTATE WITH ULTRASOUND GUIDANCE N/A 9/27/2024    Procedure: BIOPSY, PROSTATE, RECTAL APPROACH, WITH US GUIDANCE;  Surgeon: ANDREA Haas MD;  Location: [facility];  Service: Urology;  Laterality: N/A;       Family History:   Family History   Problem Relation Name Age of Onset    Atrial fibrillation Mother      Deep vein thrombosis Father      Heart attack Father      Kidney nephrosis Brother         Social History:  reports that he quit smoking about 49 years ago. His smoking use included cigarettes. He started smoking about 43 years ago. He has never used smokeless tobacco. He reports that he does not currently use alcohol after a past usage of about 7.0 standard drinks of alcohol per week. He reports that he does not currently use drugs.    Allergies:  Review of patient's allergies indicates:  No Known Allergies    Medications:  Current Outpatient Medications   Medication Sig Dispense Refill    abiraterone (ZYTIGA) 500 mg Tab Take 2 tablets (1,000 mg total) by mouth once daily. 60 tablet 6    predniSONE (DELTASONE) 5 MG tablet Take 1 tablet (5 mg total) by mouth once daily. 30 tablet 6    tamsulosin (FLOMAX) 0.4 mg Cap Take 0.4 mg by mouth once daily.       No current facility-administered medications for this visit.       Review of Systems   Constitutional:  Negative for chills, diaphoresis, fatigue, fever and unexpected weight change.   Respiratory:  Negative for cough and shortness of breath.    Cardiovascular:  Negative for chest pain and palpitations.   Gastrointestinal:  Negative for abdominal pain, constipation, diarrhea, nausea and vomiting.   Musculoskeletal:         Right thigh pain   Skin:  Negative for color change and rash.   Neurological:  Negative for headaches.   Hematological:  Negative for adenopathy. Does not bruise/bleed easily.       ECOG Performance Status: 1   Objective:      Vitals:   Vitals:    11/11/24 0915   BP: 132/70   BP Location: Right  "arm   Patient Position: Sitting   Pulse: (!) 56   Resp: 14   Temp: 97.1 °F (36.2 °C)   TempSrc: Temporal   SpO2: 99%   Weight: 70.5 kg (155 lb 6.8 oz)   Height: 5' 8.5" (1.74 m)         Physical Exam  Constitutional:       General: He is not in acute distress.     Appearance: He is well-developed. He is not diaphoretic.   HENT:      Head: Normocephalic and atraumatic.   Eyes:      General: No scleral icterus.        Right eye: No discharge.         Left eye: No discharge.   Cardiovascular:      Rate and Rhythm: Normal rate and regular rhythm.      Heart sounds: Normal heart sounds. No murmur heard.     No friction rub. No gallop.   Pulmonary:      Effort: Pulmonary effort is normal. No respiratory distress.      Breath sounds: Normal breath sounds. No wheezing or rales.   Chest:      Chest wall: No tenderness.   Abdominal:      General: Bowel sounds are normal. There is no distension.      Palpations: Abdomen is soft. There is no mass.      Tenderness: There is no abdominal tenderness. There is no rebound.   Skin:     General: Skin is warm and dry.      Findings: No erythema or rash.   Neurological:      Mental Status: He is alert and oriented to person, place, and time.   Psychiatric:         Behavior: Behavior normal.         Laboratory Data:  Lab Visit on 11/11/2024   Component Date Value Ref Range Status    WBC 11/11/2024 6.95  3.90 - 12.70 K/uL Final    RBC 11/11/2024 4.83  4.60 - 6.20 M/uL Final    Hemoglobin 11/11/2024 14.5  14.0 - 18.0 g/dL Final    Hematocrit 11/11/2024 43.8  40.0 - 54.0 % Final    MCV 11/11/2024 91  82 - 98 fL Final    MCH 11/11/2024 30.0  27.0 - 31.0 pg Final    MCHC 11/11/2024 33.1  32.0 - 36.0 g/dL Final    RDW 11/11/2024 11.9  11.5 - 14.5 % Final    Platelets 11/11/2024 266  150 - 450 K/uL Final    MPV 11/11/2024 9.7  9.2 - 12.9 fL Final    Immature Granulocytes 11/11/2024 0.1  0.0 - 0.5 % Final    Gran # (ANC) 11/11/2024 4.0  1.8 - 7.7 K/uL Final    Immature Grans (Abs) 11/11/2024 " 0.01  0.00 - 0.04 K/uL Final    Comment: Mild elevation in immature granulocytes is non specific and   can be seen in a variety of conditions including stress response,   acute inflammation, trauma and pregnancy. Correlation with other   laboratory and clinical findings is essential.      Lymph # 11/11/2024 2.0  1.0 - 4.8 K/uL Final    Mono # 11/11/2024 0.8  0.3 - 1.0 K/uL Final    Eos # 11/11/2024 0.1  0.0 - 0.5 K/uL Final    Baso # 11/11/2024 0.06  0.00 - 0.20 K/uL Final    nRBC 11/11/2024 0  0 /100 WBC Final    Gran % 11/11/2024 57.7  38.0 - 73.0 % Final    Lymph % 11/11/2024 28.2  18.0 - 48.0 % Final    Mono % 11/11/2024 11.5  4.0 - 15.0 % Final    Eosinophil % 11/11/2024 1.6  0.0 - 8.0 % Final    Basophil % 11/11/2024 0.9  0.0 - 1.9 % Final    Differential Method 11/11/2024 Automated   Final    Sodium 11/11/2024 142  136 - 145 mmol/L Final    Potassium 11/11/2024 4.2  3.5 - 5.1 mmol/L Final    Chloride 11/11/2024 108  95 - 110 mmol/L Final    CO2 11/11/2024 24  23 - 29 mmol/L Final    Glucose 11/11/2024 93  70 - 110 mg/dL Final    BUN 11/11/2024 19  8 - 23 mg/dL Final    Creatinine 11/11/2024 0.8  0.5 - 1.4 mg/dL Final    Calcium 11/11/2024 9.3  8.7 - 10.5 mg/dL Final    Total Protein 11/11/2024 7.0  6.0 - 8.4 g/dL Final    Albumin 11/11/2024 4.0  3.5 - 5.2 g/dL Final    Total Bilirubin 11/11/2024 0.4  0.1 - 1.0 mg/dL Final    Comment: For infants and newborns, interpretation of results should be based  on gestational age, weight and in agreement with clinical  observations.    Premature Infant recommended reference ranges:  Up to 24 hours.............<8.0 mg/dL  Up to 48 hours............<12.0 mg/dL  3-5 days..................<15.0 mg/dL  6-29 days.................<15.0 mg/dL      Alkaline Phosphatase 11/11/2024 87  40 - 150 U/L Final    AST 11/11/2024 16  10 - 40 U/L Final    ALT 11/11/2024 16  10 - 44 U/L Final    eGFR 11/11/2024 >60.0  >60 mL/min/1.73 m^2 Final    Anion Gap 11/11/2024 10  8 - 16 mmol/L  Final         Imaging:     MRI Prostate 9/06/24  Image quality is degraded by motion artifact, limiting assessment. There is significant motion artifact on small field of view axial T2 sequence which is used to segment the prostate gland on DynaCAD.     Prostate: 5.0 x 5.0 x 5.9 cm corresponding to a computed volume of 57 cc.     Peripheral/transition zone: Large PI-RADS 5 lesion involving the majority of the prostate gland centered in the base and mid gland however and also involves the apex. Lesion involves both the transition and peripheral zones. There is extracapsular extension, and involvement of local structures as detailed below.     Lesion (KHADIJAH) #T-1     Greatest dimension: 5.5 cm (series 5, image 16)     T2-WI: Lenticular or non-circumscribed, homogeneous,moderately hypointense, and >1.5 cm in greatest dimension, score 5.     DWI/ADC: Same as 4 but ?1.5 cm in greatest dimension or definite extraprostatic extension/invasive behavior, score 5.     DCE: Positive     Extraprostatic extension: Positive     PI-RADS assessment category: 5     Neurovascular bundle: Suspected involvement, however motion artifact obscures this region limiting assessment.     Seminal vesicles: Abnormal restricted diffusion/tumor signal extends to involve the seminal vesicles.     Adjacent Organ Involvement: Prostate lesion appears to invade through the base of the bladder extending into the bladder lumen (series 2, image 22).     Lymphadenopathy: Multiple enlarged bilateral iliac chain lymph nodes concerning for metastasis. Right external iliac chain lymph node measures 2.1 cm (series 3, image 14). Left external iliac chain lymph node measures 2 cm (series 3, image 12). Left internal iliac chain lymph node measures 1.5 cm (series 3, image 12).     Other Findings: Multiple marrow replacing, enhancing lesions involving the right pubic symphysis and inferior pubic ramus concerning for metastasis. Mild diffuse bladder wall thickening  with trabeculation and multiple small diverticula, likely sequela of chronic outlet obstruction.    PSMA PET/CT 10/04/24  Physiologic distribution of tracer within the salivary and lacrimal glands, blood pool, liver, spleen, pancreas, ganglia, bone marrow, bowel, kidneys, and urinary tract.     Prostate bed: The prostate gland is markedly enlarged and demonstrates diffuse radiotracer uptake with an SUV max of 58.     Lymph nodes: There is a metastatic right pelvic lymph node best visualized on image 268 which measures 2.8 x 1.4 cm with an SUV max of 55. There is a left internal iliac node measuring 1.9 x 1.2 cm with an SUV max of 41. There is a left external iliac node measuring 2.5 x 1.8 cm best visualized on image 258 with an SUV max of 46.     Skeleton: There is an infiltrative lesion in the right inferior pubic ramus extending into the pubic symphysis with an SUV max of 65. There is a nodular focus of radiotracer accumulation in the left inferior pubic ramus best visualized on image 291 with an SUV max of 12. There is a punctate focus of radiotracer accumulation in the posterior right iliac bone best visualized on image 255 with an SUV max of 3.3. There is a nodular focus of radiotracer accumulation within the left lamina of S1 best visualized on image 250 with an SUV max of 3.6.       Assessment:       1. Prostate cancer    2. Musculoskeletal leg pain, right             Plan:     Prostate Cancer - the patient with metastatic castrate sensitive prostate cancer with lesions in pelvic lymph nodes, right inferior veer pubic ramus, left inferior pubic ramus, right iliac bone, and lamina of S1  -The patient does not have high volume disease outside of the pelvis in his a candidate for abiraterone and prednisone along with Lupron  -PSA on 10/18/24 was 213ng/mL  -[facility]ae returned negative for germline mutations tested  -TEMPUS blood was negative.  TEMPUS tissue testing showed SPOP, HDAC2, and LRP1B mutations  -Will  proceed with Lupron today  -Pt to follow up in 4 weeks with labs   -Will repeat PSMA PET-CT 6 months after initiation of therapy to assess ability to add radiation treatment  Visit today included increased complexity associated with the care of the episodic problem (ADT and Abiratoerone/prednisone ) addressed and managing the longitudinal care of the patient due to the serious and/or complex managed problem(s) prostate cancer.    MSK Pain - in right thigh  -May be due to exercise  -Will monitor    Route Chart for Scheduling    Med Onc Chart Routing      Follow up with physician 4 weeks. Pt can proceed with Lupron.  Pt needs a CBC, CMP in 2 weeks.  Pt needs a CBC, CMP and PSA in 4 weeks with an appt with me zak day after the labs.   Follow up with FELIPE    Infusion scheduling note    Injection scheduling note    Labs    Imaging    Pharmacy appointment    Other referrals                Supportive Plan Information  OP PROSTATE LEUPROLIDE Q3MO Jose Alberto Daniel MD   Associated Diagnosis: Prostate cancer Stage IVB cTX, cN1, pM1b, Grade Group: 5 noted on 10/18/2024   Line of treatment: Supportive Care   Treatment goal: Palliative     Upcoming Treatment Dates - OP PROSTATE LEUPROLIDE Q3MO    1/27/2025       Chemotherapy       leuprolide (LUPRON) injection 22.5 mg  4/21/2025       Chemotherapy       leuprolide (LUPRON) injection 22.5 mg  7/14/2025       Chemotherapy       leuprolide (LUPRON) injection 22.5 mg  10/6/2025       Chemotherapy       leuprolide (LUPRON) injection 22.5 mg      Jose Alberto Daniel MD  [facility]  Hematology and Oncology  [facility]   [address]   [place] [address]   O: [phone]  F: [phone]

## 2024-11-11 ENCOUNTER — OFFICE VISIT (OUTPATIENT)
Dept: HEMATOLOGY/ONCOLOGY | Facility: CLINIC | Age: 59
End: 2024-11-11
Payer: MEDICARE

## 2024-11-11 ENCOUNTER — INFUSION (OUTPATIENT)
Dept: INFUSION THERAPY | Facility: HOSPITAL | Age: 59
End: 2024-11-11
Attending: INTERNAL MEDICINE
Payer: MEDICARE

## 2024-11-11 VITALS
DIASTOLIC BLOOD PRESSURE: 70 MMHG | HEIGHT: 69 IN | BODY MASS INDEX: 23.02 KG/M2 | HEART RATE: 56 BPM | TEMPERATURE: 97 F | RESPIRATION RATE: 14 BRPM | WEIGHT: 155.44 LBS | OXYGEN SATURATION: 99 % | SYSTOLIC BLOOD PRESSURE: 132 MMHG

## 2024-11-11 DIAGNOSIS — C61 PROSTATE CANCER: Primary | ICD-10-CM

## 2024-11-11 DIAGNOSIS — M79.604 MUSCULOSKELETAL LEG PAIN, RIGHT: ICD-10-CM

## 2024-11-11 PROCEDURE — G2211 COMPLEX E/M VISIT ADD ON: HCPCS | Mod: S$PBB,,, | Performed by: INTERNAL MEDICINE

## 2024-11-11 PROCEDURE — 96402 CHEMO HORMON ANTINEOPL SQ/IM: CPT | Mod: PN

## 2024-11-11 PROCEDURE — 63600175 PHARM REV CODE 636 W HCPCS: Mod: JZ,JG,PN | Performed by: INTERNAL MEDICINE

## 2024-11-11 PROCEDURE — 99213 OFFICE O/P EST LOW 20 MIN: CPT | Mod: PBBFAC,25,PN | Performed by: INTERNAL MEDICINE

## 2024-11-11 PROCEDURE — 99999 PR PBB SHADOW E&M-EST. PATIENT-LVL III: CPT | Mod: PBBFAC,,, | Performed by: INTERNAL MEDICINE

## 2024-11-11 PROCEDURE — 99215 OFFICE O/P EST HI 40 MIN: CPT | Mod: S$PBB,,, | Performed by: INTERNAL MEDICINE

## 2024-11-11 RX ORDER — TAMSULOSIN HYDROCHLORIDE 0.4 MG/1
0.4 CAPSULE ORAL DAILY
COMMUNITY
Start: 2024-09-30

## 2024-11-11 RX ADMIN — LEUPROLIDE ACETATE 22.5 MG: KIT at 10:11

## 2024-11-11 NOTE — PLAN OF CARE
Problem: Adult Inpatient Plan of Care  Goal: Plan of Care Review  Outcome: Met     Problem: Fatigue  Goal: Improved Activity Tolerance  Outcome: Progressing   Tolerated injection without diffculty D/C instructions given and pt ambulated to private vehicle per self without difficulty  NAD

## 2024-11-13 ENCOUNTER — TELEPHONE (OUTPATIENT)
Dept: HEMATOLOGY/ONCOLOGY | Facility: CLINIC | Age: 59
End: 2024-11-13
Payer: MEDICARE

## 2024-11-13 NOTE — TELEPHONE ENCOUNTER
Returned pt's phone call.  Cancelled appt with Brant Telles NP for 11/25, only lab appt needed per Dr Daniel's last note, and lab appt scheduled correctly.  Pt had no further questions or concerns.    ----- Message from [place] sent at 11/13/2024  9:09 AM CST -----  Type: Needs Medical Advice  Who Called:  Patient   Symptoms (please be specific):    How long has patient had these symptoms:    Pharmacy name and phone #:    Best Call Back Number: [phone]  Additional Information: Patient is requesting a call back from the nurse regarding his upcoming appts..

## 2024-11-20 ENCOUNTER — TELEPHONE (OUTPATIENT)
Dept: HEMATOLOGY/ONCOLOGY | Facility: CLINIC | Age: 59
End: 2024-11-20
Payer: MEDICARE

## 2024-11-20 ENCOUNTER — PATIENT MESSAGE (OUTPATIENT)
Dept: HEMATOLOGY/ONCOLOGY | Facility: CLINIC | Age: 59
End: 2024-11-20
Payer: MEDICARE

## 2024-11-20 NOTE — NURSING
Chart reviewed. Mr. Cardenas has begun treatment with Lupron and aibraterone for dx metastatic prostate ca.     Repeat labs scheduled on 12/6. F/u visit with MARCUS Telles NP scheduled on 12/9.     LVM with my contact information. Requested return call from Mr. Cardenas with any questions or concerns.     Will continue to follow for [facility] navigation needs.

## 2024-11-25 ENCOUNTER — LAB VISIT (OUTPATIENT)
Dept: LAB | Facility: HOSPITAL | Age: 59
End: 2024-11-25
Attending: INTERNAL MEDICINE
Payer: MEDICARE

## 2024-11-25 DIAGNOSIS — C61 PROSTATE CANCER: ICD-10-CM

## 2024-11-25 LAB
ALBUMIN SERPL BCP-MCNC: 3.7 G/DL (ref 3.5–5.2)
ALP SERPL-CCNC: 76 U/L (ref 40–150)
ALT SERPL W/O P-5'-P-CCNC: 21 U/L (ref 10–44)
ANION GAP SERPL CALC-SCNC: 8 MMOL/L (ref 8–16)
AST SERPL-CCNC: 21 U/L (ref 10–40)
BASOPHILS # BLD AUTO: 0.07 K/UL (ref 0–0.2)
BASOPHILS NFR BLD: 1.1 % (ref 0–1.9)
BILIRUB SERPL-MCNC: 0.4 MG/DL (ref 0.1–1)
BUN SERPL-MCNC: 17 MG/DL (ref 8–23)
CALCIUM SERPL-MCNC: 8.9 MG/DL (ref 8.7–10.5)
CHLORIDE SERPL-SCNC: 107 MMOL/L (ref 95–110)
CO2 SERPL-SCNC: 26 MMOL/L (ref 23–29)
CREAT SERPL-MCNC: 0.8 MG/DL (ref 0.5–1.4)
DIFFERENTIAL METHOD BLD: ABNORMAL
EOSINOPHIL # BLD AUTO: 0.1 K/UL (ref 0–0.5)
EOSINOPHIL NFR BLD: 1.6 % (ref 0–8)
ERYTHROCYTE [DISTWIDTH] IN BLOOD BY AUTOMATED COUNT: 12.1 % (ref 11.5–14.5)
EST. GFR  (NO RACE VARIABLE): >60 ML/MIN/1.73 M^2
GLUCOSE SERPL-MCNC: 81 MG/DL (ref 70–110)
HCT VFR BLD AUTO: 40.9 % (ref 40–54)
HGB BLD-MCNC: 13.8 G/DL (ref 14–18)
IMM GRANULOCYTES # BLD AUTO: 0.01 K/UL (ref 0–0.04)
IMM GRANULOCYTES NFR BLD AUTO: 0.2 % (ref 0–0.5)
LYMPHOCYTES # BLD AUTO: 1.7 K/UL (ref 1–4.8)
LYMPHOCYTES NFR BLD: 27.1 % (ref 18–48)
MCH RBC QN AUTO: 30.5 PG (ref 27–31)
MCHC RBC AUTO-ENTMCNC: 33.7 G/DL (ref 32–36)
MCV RBC AUTO: 91 FL (ref 82–98)
MONOCYTES # BLD AUTO: 0.7 K/UL (ref 0.3–1)
MONOCYTES NFR BLD: 10.5 % (ref 4–15)
NEUTROPHILS # BLD AUTO: 3.8 K/UL (ref 1.8–7.7)
NEUTROPHILS NFR BLD: 59.5 % (ref 38–73)
NRBC BLD-RTO: 0 /100 WBC
PLATELET # BLD AUTO: 224 K/UL (ref 150–450)
PMV BLD AUTO: 10 FL (ref 9.2–12.9)
POTASSIUM SERPL-SCNC: 3.6 MMOL/L (ref 3.5–5.1)
PROT SERPL-MCNC: 6.3 G/DL (ref 6–8.4)
RBC # BLD AUTO: 4.52 M/UL (ref 4.6–6.2)
SODIUM SERPL-SCNC: 141 MMOL/L (ref 136–145)
WBC # BLD AUTO: 6.3 K/UL (ref 3.9–12.7)

## 2024-11-25 PROCEDURE — 85025 COMPLETE CBC W/AUTO DIFF WBC: CPT | Mod: PN | Performed by: INTERNAL MEDICINE

## 2024-11-25 PROCEDURE — 80053 COMPREHEN METABOLIC PANEL: CPT | Mod: PN | Performed by: INTERNAL MEDICINE

## 2024-11-25 PROCEDURE — 36415 COLL VENOUS BLD VENIPUNCTURE: CPT | Mod: PN | Performed by: INTERNAL MEDICINE

## 2024-12-06 ENCOUNTER — LAB VISIT (OUTPATIENT)
Dept: LAB | Facility: HOSPITAL | Age: 59
End: 2024-12-06
Attending: INTERNAL MEDICINE
Payer: MEDICARE

## 2024-12-06 DIAGNOSIS — C61 PROSTATE CANCER: ICD-10-CM

## 2024-12-06 LAB
ALBUMIN SERPL BCP-MCNC: 3.8 G/DL (ref 3.5–5.2)
ALP SERPL-CCNC: 68 U/L (ref 40–150)
ALT SERPL W/O P-5'-P-CCNC: 26 U/L (ref 10–44)
ANION GAP SERPL CALC-SCNC: 11 MMOL/L (ref 8–16)
AST SERPL-CCNC: 28 U/L (ref 10–40)
BASOPHILS # BLD AUTO: 0.05 K/UL (ref 0–0.2)
BASOPHILS NFR BLD: 0.8 % (ref 0–1.9)
BILIRUB SERPL-MCNC: 0.5 MG/DL (ref 0.1–1)
BUN SERPL-MCNC: 16 MG/DL (ref 8–23)
CALCIUM SERPL-MCNC: 9.2 MG/DL (ref 8.7–10.5)
CHLORIDE SERPL-SCNC: 106 MMOL/L (ref 95–110)
CO2 SERPL-SCNC: 24 MMOL/L (ref 23–29)
COMPLEXED PSA SERPL-MCNC: 9.9 NG/ML (ref 0–4)
CREAT SERPL-MCNC: 0.8 MG/DL (ref 0.5–1.4)
DIFFERENTIAL METHOD BLD: ABNORMAL
EOSINOPHIL # BLD AUTO: 0.1 K/UL (ref 0–0.5)
EOSINOPHIL NFR BLD: 1.2 % (ref 0–8)
ERYTHROCYTE [DISTWIDTH] IN BLOOD BY AUTOMATED COUNT: 12.3 % (ref 11.5–14.5)
EST. GFR  (NO RACE VARIABLE): >60 ML/MIN/1.73 M^2
GLUCOSE SERPL-MCNC: 85 MG/DL (ref 70–110)
HCT VFR BLD AUTO: 41.1 % (ref 40–54)
HGB BLD-MCNC: 13.8 G/DL (ref 14–18)
IMM GRANULOCYTES # BLD AUTO: 0.01 K/UL (ref 0–0.04)
IMM GRANULOCYTES NFR BLD AUTO: 0.2 % (ref 0–0.5)
LYMPHOCYTES # BLD AUTO: 1.8 K/UL (ref 1–4.8)
LYMPHOCYTES NFR BLD: 27.1 % (ref 18–48)
MCH RBC QN AUTO: 30.5 PG (ref 27–31)
MCHC RBC AUTO-ENTMCNC: 33.6 G/DL (ref 32–36)
MCV RBC AUTO: 91 FL (ref 82–98)
MONOCYTES # BLD AUTO: 0.7 K/UL (ref 0.3–1)
MONOCYTES NFR BLD: 10.4 % (ref 4–15)
NEUTROPHILS # BLD AUTO: 3.9 K/UL (ref 1.8–7.7)
NEUTROPHILS NFR BLD: 60.3 % (ref 38–73)
NRBC BLD-RTO: 0 /100 WBC
PLATELET # BLD AUTO: 219 K/UL (ref 150–450)
PMV BLD AUTO: 10.4 FL (ref 9.2–12.9)
POTASSIUM SERPL-SCNC: 3.6 MMOL/L (ref 3.5–5.1)
PROT SERPL-MCNC: 6.7 G/DL (ref 6–8.4)
RBC # BLD AUTO: 4.52 M/UL (ref 4.6–6.2)
SODIUM SERPL-SCNC: 141 MMOL/L (ref 136–145)
WBC # BLD AUTO: 6.52 K/UL (ref 3.9–12.7)

## 2024-12-06 PROCEDURE — 36415 COLL VENOUS BLD VENIPUNCTURE: CPT | Mod: PN | Performed by: INTERNAL MEDICINE

## 2024-12-06 PROCEDURE — 80053 COMPREHEN METABOLIC PANEL: CPT | Mod: PN | Performed by: INTERNAL MEDICINE

## 2024-12-06 PROCEDURE — 85025 COMPLETE CBC W/AUTO DIFF WBC: CPT | Mod: PN | Performed by: INTERNAL MEDICINE

## 2024-12-06 PROCEDURE — 84153 ASSAY OF PSA TOTAL: CPT | Performed by: INTERNAL MEDICINE

## 2024-12-09 ENCOUNTER — OFFICE VISIT (OUTPATIENT)
Dept: HEMATOLOGY/ONCOLOGY | Facility: CLINIC | Age: 59
End: 2024-12-09
Payer: MEDICARE

## 2024-12-09 VITALS
DIASTOLIC BLOOD PRESSURE: 71 MMHG | TEMPERATURE: 97 F | BODY MASS INDEX: 24.07 KG/M2 | HEIGHT: 69 IN | OXYGEN SATURATION: 100 % | WEIGHT: 162.5 LBS | SYSTOLIC BLOOD PRESSURE: 127 MMHG | HEART RATE: 68 BPM | RESPIRATION RATE: 18 BRPM

## 2024-12-09 DIAGNOSIS — C61 PROSTATE CANCER: Primary | ICD-10-CM

## 2024-12-09 PROCEDURE — 99213 OFFICE O/P EST LOW 20 MIN: CPT | Mod: S$PBB,,, | Performed by: NURSE PRACTITIONER

## 2024-12-09 PROCEDURE — 99999 PR PBB SHADOW E&M-EST. PATIENT-LVL III: CPT | Mod: PBBFAC,,, | Performed by: NURSE PRACTITIONER

## 2024-12-09 PROCEDURE — 99213 OFFICE O/P EST LOW 20 MIN: CPT | Mod: PBBFAC,PN | Performed by: NURSE PRACTITIONER

## 2024-12-09 RX ORDER — TAMSULOSIN HYDROCHLORIDE 0.4 MG/1
0.4 CAPSULE ORAL DAILY
Qty: 90 CAPSULE | Refills: 0 | Status: SHIPPED | OUTPATIENT
Start: 2024-12-09

## 2024-12-09 NOTE — PROGRESS NOTES
PATIENT: Ronald Martinez  MRN: [id number]  DATE: 12/9/2024    Diagnosis:   1. Prostate cancer      Chief Complaint: Establish Care (4 wk f/u with labs/)    Oncologic History:   Oncology History   Prostate cancer   10/18/2024 Initial Diagnosis    Prostate cancer     10/20/2024 Cancer Staged    Staging form: Prostate, AJCC 8th Edition  - Clinical: Stage IVB (cTX, cN1, pM1b, Grade Group: 5)       Subjective:    Interval History: Mr. Martinez is a 59 y.o. male presets for metastatic prostate cancer.  Since the last clinic visit the patient received his 1st Lupron on 11/11/24.  He denies any difficulties with Lupron. Compliant with Abiraterone & Prednisone daily. The patient denies CP, cough, SOB, abdominal pain, nausea, vomiting, constipation, diarrhea.  The patient denies fever, chills, night sweats, weight loss, new lumps or bumps, easy bruising or bleeding.    Prior History:  PSA on 10/20/2023 was 64.9 ng/mL.  The patient underwent MRI prostate 09/06/2024 showing a large lesion involving the majority of the prostate gland.  The patient underwent prostate biopsy on 09/27/2024 showing prostatic adenocarcinoma [place] score 4+5=9 grade group 5.  Patient underwent PSMA PET-CT on 10/04/2024 showing markedly enlarged prostate gland with diffuse radiotracer uptake; metastatic right pelvic lymph node measuring 2.8 x 1.4 cm; left internal iliac node measuring 1.9 x 1.2 cm; left external iliac lymph node measuring 2.5 x 1.5 cm; infiltrative lesion in the right inferior pubic ramus extending into the pubic symphysis; nodular focus of radiotracer accumulation in the left inferior pubic ramus; punctate focus of radiotracer accumulation in the right iliac bone; nodular focus of radiotracer accumulation within the left lamina of S1.    [employer]ae returned negative for germline mutations tested.  TEMPUS blood was negative.  TEMPUS tissue testing showed SPOP, HDAC2, and LRP1B mutations  Past Medical History:   Past Medical History:    Diagnosis Date    Cancer     prostate       Past Surgical HIstory:   Past Surgical History:   Procedure Laterality Date    CYST REMOVAL      TRANSRECTAL BIOPSY OF PROSTATE WITH ULTRASOUND GUIDANCE N/A 9/27/2024    Procedure: BIOPSY, PROSTATE, RECTAL APPROACH, WITH US GUIDANCE;  Surgeon: ANDREA Haas MD;  Location: [facility];  Service: Urology;  Laterality: N/A;       Family History:   Family History   Problem Relation Name Age of Onset    Atrial fibrillation Mother      Deep vein thrombosis Father      Heart attack Father      Kidney nephrosis Brother         Social History:  reports that he quit smoking about 49 years ago. His smoking use included cigarettes. He started smoking about 43 years ago. He has never used smokeless tobacco. He reports that he does not currently use alcohol after a past usage of about 7.0 standard drinks of alcohol per week. He reports that he does not currently use drugs.    Allergies:  Review of patient's allergies indicates:  No Known Allergies    Medications:  Current Outpatient Medications   Medication Sig Dispense Refill    abiraterone (ZYTIGA) 500 mg Tab Take 2 tablets (1,000 mg total) by mouth once daily. 60 tablet 6    predniSONE (DELTASONE) 5 MG tablet Take 1 tablet (5 mg total) by mouth once daily. 30 tablet 6    tamsulosin (FLOMAX) 0.4 mg Cap Take 1 capsule (0.4 mg total) by mouth once daily. 90 capsule 0     No current facility-administered medications for this visit.       Review of Systems   Constitutional:  Negative for appetite change, chills, diaphoresis, fatigue, fever and unexpected weight change.   HENT:  Negative for mouth sores.    Eyes:  Negative for visual disturbance.   Respiratory:  Negative for cough and shortness of breath.    Cardiovascular:  Negative for chest pain and palpitations.   Gastrointestinal:  Negative for abdominal pain, constipation, diarrhea, nausea and vomiting.   Genitourinary:  Positive for frequency.   Musculoskeletal:  Positive for  "back pain.        Right thigh pain   Skin:  Negative for color change and rash.   Neurological:  Negative for headaches.   Hematological:  Negative for adenopathy. Does not bruise/bleed easily.   Psychiatric/Behavioral:  The patient is not nervous/anxious.        ECOG Performance Status: 1   Objective:    Weight:  Gain of 7 pounds in 1 month  Vitals:   Vitals:    12/09/24 1416   BP: 127/71   BP Location: Left arm   Patient Position: Sitting   Pulse: 68   Resp: 18   Temp: 96.9 °F (36.1 °C)   TempSrc: Temporal   SpO2: 100%   Weight: 73.7 kg (162 lb 7.7 oz)   Height: 5' 8.5" (1.74 m)     Physical Exam  Constitutional:       General: He is not in acute distress.     Appearance: He is well-developed. He is not diaphoretic.   HENT:      Head: Normocephalic and atraumatic.   Eyes:      General: No scleral icterus.        Right eye: No discharge.         Left eye: No discharge.   Cardiovascular:      Rate and Rhythm: Normal rate and regular rhythm.      Heart sounds: Normal heart sounds. No murmur heard.     No friction rub. No gallop.   Pulmonary:      Effort: Pulmonary effort is normal. No respiratory distress.      Breath sounds: Normal breath sounds. No wheezing or rales.   Chest:      Chest wall: No tenderness.   Abdominal:      General: Bowel sounds are normal. There is no distension.      Palpations: Abdomen is soft. There is no mass.      Tenderness: There is no abdominal tenderness. There is no rebound.   Skin:     General: Skin is warm and dry.      Findings: No erythema or rash.   Neurological:      Mental Status: He is alert and oriented to person, place, and time.   Psychiatric:         Behavior: Behavior normal.       Laboratory Data:  Lab Visit on 12/06/2024   Component Date Value Ref Range Status    WBC 12/06/2024 6.52  3.90 - 12.70 K/uL Final    RBC 12/06/2024 4.52 (L)  4.60 - 6.20 M/uL Final    Hemoglobin 12/06/2024 13.8 (L)  14.0 - 18.0 g/dL Final    Hematocrit 12/06/2024 41.1  40.0 - 54.0 % Final    MCV " 12/06/2024 91  82 - 98 fL Final    MCH 12/06/2024 30.5  27.0 - 31.0 pg Final    MCHC 12/06/2024 33.6  32.0 - 36.0 g/dL Final    RDW 12/06/2024 12.3  11.5 - 14.5 % Final    Platelets 12/06/2024 219  150 - 450 K/uL Final    MPV 12/06/2024 10.4  9.2 - 12.9 fL Final    Immature Granulocytes 12/06/2024 0.2  0.0 - 0.5 % Final    Gran # (ANC) 12/06/2024 3.9  1.8 - 7.7 K/uL Final    Immature Grans (Abs) 12/06/2024 0.01  0.00 - 0.04 K/uL Final    Comment: Mild elevation in immature granulocytes is non specific and   can be seen in a variety of conditions including stress response,   acute inflammation, trauma and pregnancy. Correlation with other   laboratory and clinical findings is essential.      Lymph # 12/06/2024 1.8  1.0 - 4.8 K/uL Final    Mono # 12/06/2024 0.7  0.3 - 1.0 K/uL Final    Eos # 12/06/2024 0.1  0.0 - 0.5 K/uL Final    Baso # 12/06/2024 0.05  0.00 - 0.20 K/uL Final    nRBC 12/06/2024 0  0 /100 WBC Final    Gran % 12/06/2024 60.3  38.0 - 73.0 % Final    Lymph % 12/06/2024 27.1  18.0 - 48.0 % Final    Mono % 12/06/2024 10.4  4.0 - 15.0 % Final    Eosinophil % 12/06/2024 1.2  0.0 - 8.0 % Final    Basophil % 12/06/2024 0.8  0.0 - 1.9 % Final    Differential Method 12/06/2024 Automated   Final    Sodium 12/06/2024 141  136 - 145 mmol/L Final    Potassium 12/06/2024 3.6  3.5 - 5.1 mmol/L Final    Chloride 12/06/2024 106  95 - 110 mmol/L Final    CO2 12/06/2024 24  23 - 29 mmol/L Final    Glucose 12/06/2024 85  70 - 110 mg/dL Final    BUN 12/06/2024 16  8 - 23 mg/dL Final    Creatinine 12/06/2024 0.8  0.5 - 1.4 mg/dL Final    Calcium 12/06/2024 9.2  8.7 - 10.5 mg/dL Final    Total Protein 12/06/2024 6.7  6.0 - 8.4 g/dL Final    Albumin 12/06/2024 3.8  3.5 - 5.2 g/dL Final    Total Bilirubin 12/06/2024 0.5  0.1 - 1.0 mg/dL Final    Comment: For infants and newborns, interpretation of results should be based  on gestational age, weight and in agreement with clinical  observations.    Premature Infant recommended  reference ranges:  Up to 24 hours.............<8.0 mg/dL  Up to 48 hours............<12.0 mg/dL  3-5 days..................<15.0 mg/dL  6-29 days.................<15.0 mg/dL      Alkaline Phosphatase 12/06/2024 68  40 - 150 U/L Final    AST 12/06/2024 28  10 - 40 U/L Final    ALT 12/06/2024 26  10 - 44 U/L Final    eGFR 12/06/2024 >60.0  >60 mL/min/1.73 m^2 Final    Anion Gap 12/06/2024 11  8 - 16 mmol/L Final    PSA Diagnostic 12/06/2024 9.9 (H)  0.00 - 4.00 ng/mL Final    Comment: The testing method is a chemiluminescent microparticle immunoassay   manufactured by [employer] and performed on the [employer]   or   [employer] system. Values obtained with different assay manufacturers   for   methods may be different and cannot be used interchangeably.  PSA Expected levels:  Hormonal Therapy: <0.05 ng/ml  Prostatectomy: <0.01 ng/ml  Radiation Therapy: <1.00 ng/ml           Imaging:     MRI Prostate 9/06/24  Image quality is degraded by motion artifact, limiting assessment. There is significant motion artifact on small field of view axial T2 sequence which is used to segment the prostate gland on [employer].     Prostate: 5.0 x 5.0 x 5.9 cm corresponding to a computed volume of 57 cc.     Peripheral/transition zone: Large PI-RADS 5 lesion involving the majority of the prostate gland centered in the base and mid gland however and also involves the apex. Lesion involves both the transition and peripheral zones. There is extracapsular extension, and involvement of local structures as detailed below.     Lesion (KHADIJAH) #T-1     Greatest dimension: 5.5 cm (series 5, image 16)     T2-WI: Lenticular or non-circumscribed, homogeneous,moderately hypointense, and >1.5 cm in greatest dimension, score 5.     DWI/ADC: Same as 4 but ?1.5 cm in greatest dimension or definite extraprostatic extension/invasive behavior, score 5.     DCE: Positive     Extraprostatic extension: Positive     PI-RADS assessment category: 5      Neurovascular bundle: Suspected involvement, however motion artifact obscures this region limiting assessment.     Seminal vesicles: Abnormal restricted diffusion/tumor signal extends to involve the seminal vesicles.     Adjacent Organ Involvement: Prostate lesion appears to invade through the base of the bladder extending into the bladder lumen (series 2, image 22).     Lymphadenopathy: Multiple enlarged bilateral iliac chain lymph nodes concerning for metastasis. Right external iliac chain lymph node measures 2.1 cm (series 3, image 14). Left external iliac chain lymph node measures 2 cm (series 3, image 12). Left internal iliac chain lymph node measures 1.5 cm (series 3, image 12).     Other Findings: Multiple marrow replacing, enhancing lesions involving the right pubic symphysis and inferior pubic ramus concerning for metastasis. Mild diffuse bladder wall thickening with trabeculation and multiple small diverticula, likely sequela of chronic outlet obstruction.    PSMA PET/CT 10/04/24  Physiologic distribution of tracer within the salivary and lacrimal glands, blood pool, liver, spleen, pancreas, ganglia, bone marrow, bowel, kidneys, and urinary tract.     Prostate bed: The prostate gland is markedly enlarged and demonstrates diffuse radiotracer uptake with an SUV max of 58.     Lymph nodes: There is a metastatic right pelvic lymph node best visualized on image 268 which measures 2.8 x 1.4 cm with an SUV max of 55. There is a left internal iliac node measuring 1.9 x 1.2 cm with an SUV max of 41. There is a left external iliac node measuring 2.5 x 1.8 cm best visualized on image 258 with an SUV max of 46.     Skeleton: There is an infiltrative lesion in the right inferior pubic ramus extending into the pubic symphysis with an SUV max of 65. There is a nodular focus of radiotracer accumulation in the left inferior pubic ramus best visualized on image 291 with an SUV max of 12. There is a punctate focus of  radiotracer accumulation in the posterior right iliac bone best visualized on image 255 with an SUV max of 3.3. There is a nodular focus of radiotracer accumulation within the left lamina of S1 best visualized on image 250 with an SUV max of 3.6.       Assessment:       1. Prostate cancer         Plan:     Prostate Cancer - the patient with metastatic castrate sensitive prostate cancer with lesions in pelvic lymph nodes, right inferior veer pubic ramus, left inferior pubic ramus, right iliac bone, and lamina of S1  -The patient does not have high volume disease outside of the pelvis in his a candidate for abiraterone and prednisone along with Lupron  -PSA on 10/18/24 was 213ng/mL  -Inv[employer]ae returned negative for germline mutations tested  -TEMPUS blood was negative.  TEMPUS tissue testing showed SPOP, HDAC2, and LRP1B mutations  -Will proceed with Lupron today  -Pt to follow up in 4 weeks with labs   -Will repeat PSMA PET-CT 6 months after initiation of therapy to assess ability to add radiation treatment  12/09/24:  Stable; f/u in 4 weeks with Dr. Daniel with CBC, CMP, PSA prior.  Continue Abiraterone/Prednisone; next Lupron due 02/11/25.  Visit today included increased complexity associated with the care of the episodic problem (ADT and Abiratoerone/prednisone ) addressed and managing the longitudinal care of the patient due to the serious and/or complex managed problem(s) prostate cancer.    MSK Pain - in right thigh  -May be due to exercise  -12/09/24:  stable; Will monitor    Route Chart for Scheduling    Med Onc Chart Routing      Follow up with physician 4 weeks. f/u in 4 weeks with Dr. Daniel with labs 1-2 days prior:  CBC, CMP, PSA   Follow up with FELIPE    Infusion scheduling note    Injection scheduling note    Labs    Imaging    Pharmacy appointment    Other referrals          Supportive Plan Information  OP PROSTATE LEUPROLIDE Q3MO Jose Alberto Daniel MD   Associated Diagnosis: Prostate cancer Stage IVB cTX,  cN1, pM1b, Grade Group: 5 noted on 10/18/2024   Line of treatment: Supportive Care   Treatment goal: Palliative     Upcoming Treatment Dates - OP PROSTATE LEUPROLIDE Q3MO    1/27/2025       Chemotherapy       leuprolide (LUPRON) injection 22.5 mg  4/21/2025       Chemotherapy       leuprolide (LUPRON) injection 22.5 mg  7/14/2025       Chemotherapy       leuprolide (LUPRON) injection 22.5 mg  10/6/2025       Chemotherapy       leuprolide (LUPRON) injection 22.5 mg    SUZANNE Kaminski, FNP-C  [facility]  20 minutes of total time spent on the encounter, which includes face to face time and non-face to face time preparing to see the patient (eg, review of tests), Obtaining and/or reviewing separately obtained history, Documenting clinical information in the electronic or other health record, Independently interpreting results if documented above (not separately reported) and communicating results to the patient/family/caregiver, or Care coordination (not separately reported).     Addendum:  Upon the end of the visit, the patient c/o pain in hip & back.  He states that it is constant & gets worse with movement.  He reports that when he sits or stops movement, the pain stops.  He is able to go to sleep & remain asleep without difficulty.  He is questioning XRT.  Reviewed Dr. Hills's chart as he thought XRT was in the plan:  Radiation Treatment Details:   No radiation therapy planned at this time  Consider future prostate-directed and/or metastasis-directed therapy depending on response to systemic therapy  As the patient has not tried any OTC medications, discussed the use of Ibuprofen with food starting with 200 mg p.o. with breakfast.  If no relief in 2 hours, he make take another 200 mg.  Monitor during the day - if by afternoon/evening the pain resumes, try same scenario with Ibuprofen again.  If the patient prefers, he may try Tylenol.  Call for any swelling of hands,  feet, or lower legs may.

## 2024-12-24 ENCOUNTER — PATIENT MESSAGE (OUTPATIENT)
Dept: HEMATOLOGY/ONCOLOGY | Facility: CLINIC | Age: 59
End: 2024-12-24
Payer: MEDICARE

## 2025-01-06 ENCOUNTER — LAB VISIT (OUTPATIENT)
Dept: LAB | Facility: HOSPITAL | Age: 60
End: 2025-01-06
Attending: INTERNAL MEDICINE
Payer: MEDICARE

## 2025-01-06 DIAGNOSIS — C61 PROSTATE CANCER: ICD-10-CM

## 2025-01-06 LAB
ALBUMIN SERPL BCP-MCNC: 3.8 G/DL (ref 3.5–5.2)
ALP SERPL-CCNC: 67 U/L (ref 40–150)
ALT SERPL W/O P-5'-P-CCNC: 21 U/L (ref 10–44)
ANION GAP SERPL CALC-SCNC: 8 MMOL/L (ref 8–16)
AST SERPL-CCNC: 22 U/L (ref 10–40)
BASOPHILS # BLD AUTO: 0.07 K/UL (ref 0–0.2)
BASOPHILS NFR BLD: 1.2 % (ref 0–1.9)
BILIRUB SERPL-MCNC: 0.3 MG/DL (ref 0.1–1)
BUN SERPL-MCNC: 16 MG/DL (ref 8–23)
CALCIUM SERPL-MCNC: 9 MG/DL (ref 8.7–10.5)
CHLORIDE SERPL-SCNC: 106 MMOL/L (ref 95–110)
CO2 SERPL-SCNC: 25 MMOL/L (ref 23–29)
COMPLEXED PSA SERPL-MCNC: 3 NG/ML (ref 0–4)
CREAT SERPL-MCNC: 0.8 MG/DL (ref 0.5–1.4)
DIFFERENTIAL METHOD BLD: ABNORMAL
EOSINOPHIL # BLD AUTO: 0.1 K/UL (ref 0–0.5)
EOSINOPHIL NFR BLD: 2.3 % (ref 0–8)
ERYTHROCYTE [DISTWIDTH] IN BLOOD BY AUTOMATED COUNT: 12.2 % (ref 11.5–14.5)
EST. GFR  (NO RACE VARIABLE): >60 ML/MIN/1.73 M^2
GLUCOSE SERPL-MCNC: 85 MG/DL (ref 70–110)
HCT VFR BLD AUTO: 40.7 % (ref 40–54)
HGB BLD-MCNC: 13.9 G/DL (ref 14–18)
IMM GRANULOCYTES # BLD AUTO: 0.01 K/UL (ref 0–0.04)
IMM GRANULOCYTES NFR BLD AUTO: 0.2 % (ref 0–0.5)
LYMPHOCYTES # BLD AUTO: 1.6 K/UL (ref 1–4.8)
LYMPHOCYTES NFR BLD: 27.4 % (ref 18–48)
MCH RBC QN AUTO: 30.7 PG (ref 27–31)
MCHC RBC AUTO-ENTMCNC: 34.2 G/DL (ref 32–36)
MCV RBC AUTO: 90 FL (ref 82–98)
MONOCYTES # BLD AUTO: 0.7 K/UL (ref 0.3–1)
MONOCYTES NFR BLD: 11.6 % (ref 4–15)
NEUTROPHILS # BLD AUTO: 3.3 K/UL (ref 1.8–7.7)
NEUTROPHILS NFR BLD: 57.3 % (ref 38–73)
NRBC BLD-RTO: 0 /100 WBC
PLATELET # BLD AUTO: 231 K/UL (ref 150–450)
PMV BLD AUTO: 9.7 FL (ref 9.2–12.9)
POTASSIUM SERPL-SCNC: 3.6 MMOL/L (ref 3.5–5.1)
PROT SERPL-MCNC: 6.7 G/DL (ref 6–8.4)
RBC # BLD AUTO: 4.53 M/UL (ref 4.6–6.2)
SODIUM SERPL-SCNC: 139 MMOL/L (ref 136–145)
WBC # BLD AUTO: 5.77 K/UL (ref 3.9–12.7)

## 2025-01-06 PROCEDURE — 85025 COMPLETE CBC W/AUTO DIFF WBC: CPT | Mod: PN | Performed by: NURSE PRACTITIONER

## 2025-01-06 PROCEDURE — 80053 COMPREHEN METABOLIC PANEL: CPT | Mod: PN | Performed by: NURSE PRACTITIONER

## 2025-01-06 PROCEDURE — 36415 COLL VENOUS BLD VENIPUNCTURE: CPT | Mod: PN | Performed by: NURSE PRACTITIONER

## 2025-01-06 PROCEDURE — 84153 ASSAY OF PSA TOTAL: CPT | Performed by: NURSE PRACTITIONER

## 2025-01-08 ENCOUNTER — OFFICE VISIT (OUTPATIENT)
Dept: HEMATOLOGY/ONCOLOGY | Facility: CLINIC | Age: 60
End: 2025-01-08
Payer: MEDICARE

## 2025-01-08 VITALS
HEIGHT: 69 IN | RESPIRATION RATE: 14 BRPM | WEIGHT: 166.25 LBS | HEART RATE: 75 BPM | BODY MASS INDEX: 24.62 KG/M2 | SYSTOLIC BLOOD PRESSURE: 130 MMHG | TEMPERATURE: 98 F | DIASTOLIC BLOOD PRESSURE: 80 MMHG

## 2025-01-08 DIAGNOSIS — C61 PROSTATE CANCER: Primary | ICD-10-CM

## 2025-01-08 DIAGNOSIS — M19.90 ARTHRITIS: ICD-10-CM

## 2025-01-08 DIAGNOSIS — D64.9 NORMOCYTIC ANEMIA: ICD-10-CM

## 2025-01-08 DIAGNOSIS — C79.51 METASTASIS TO BONE: ICD-10-CM

## 2025-01-08 PROCEDURE — 99213 OFFICE O/P EST LOW 20 MIN: CPT | Mod: PBBFAC,PN | Performed by: INTERNAL MEDICINE

## 2025-01-08 PROCEDURE — 99999 PR PBB SHADOW E&M-EST. PATIENT-LVL III: CPT | Mod: PBBFAC,,, | Performed by: INTERNAL MEDICINE

## 2025-01-08 PROCEDURE — G2211 COMPLEX E/M VISIT ADD ON: HCPCS | Mod: S$PBB,,, | Performed by: INTERNAL MEDICINE

## 2025-01-08 PROCEDURE — 99215 OFFICE O/P EST HI 40 MIN: CPT | Mod: S$PBB,,, | Performed by: INTERNAL MEDICINE

## 2025-01-08 NOTE — PROGRESS NOTES
PATIENT: Ronald Martinez  MRN: [id number]  DATE: 1/8/2025      Diagnosis:   1. Prostate cancer    2. Metastasis to bone    3. Normocytic anemia    4. Arthritis            Chief Complaint: Prostate Cancer (2 month follow up )      Oncologic History:      Oncologic History     Oncologic Treatment     Pathology           Subjective:    Interval History: Mr. Martinez is a 59 y.o. male presets for metastatic prostate cancer.  Since the last clinic visit PSA was 3ng/mL on 1/06/25.  Pt endorses lower back pain and occasional pain in his left shoulder.  Pain in the left shoulder started after taking Abiraterone.   The patient denies CP, cough, SOB, abdominal pain, nausea, vomiting, constipation, diarrhea.  The patient denies fever, chills, night sweats, weight loss, new lumps or bumps, easy bruising or bleeding.    Prior History:  PSA on 10/20/2023 was 64.9 ng/mL.  The patient underwent MRI prostate 09/06/2024 showing a large lesion involving the majority of the prostate gland.  The patient underwent prostate biopsy on 09/27/2024 showing prostatic adenocarcinoma Lindsey score 4+5=9 grade group 5.  Patient underwent PSMA PET-CT on 10/04/2024 showing markedly enlarged prostate gland with diffuse radiotracer uptake; metastatic right pelvic lymph node measuring 2.8 x 1.4 cm; left internal iliac node measuring 1.9 x 1.2 cm; left external iliac lymph node measuring 2.5 x 1.5 cm; infiltrative lesion in the right inferior pubic ramus extending into the pubic symphysis; nodular focus of radiotracer accumulation in the left inferior pubic ramus; punctate focus of radiotracer accumulation in the right iliac bone; nodular focus of radiotracer accumulation within the left lamina of S1.     PSA on 10/18/24 was 213ng/mL.  [employer]ae returned negative for germline mutations tested.  TEMPUS blood was negative.  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    Past Medical History:   Past Medical History:   Diagnosis Date    Cancer      prostate       Past Surgical HIstory:   Past Surgical History:   Procedure Laterality Date    CYST REMOVAL      TRANSRECTAL BIOPSY OF PROSTATE WITH ULTRASOUND GUIDANCE N/A 9/27/2024    Procedure: BIOPSY, PROSTATE, RECTAL APPROACH, WITH US GUIDANCE;  Surgeon: ANDREA Haas MD;  Location: [facility];  Service: Urology;  Laterality: N/A;       Family History:   Family History   Problem Relation Name Age of Onset    Atrial fibrillation Mother      Deep vein thrombosis Father      Heart attack Father      Kidney nephrosis Brother         Social History:  reports that he quit smoking about 50 years ago. His smoking use included cigarettes. He started smoking about 44 years ago. He has never used smokeless tobacco. He reports that he does not currently use alcohol after a past usage of about 7.0 standard drinks of alcohol per week. He reports that he does not currently use drugs.    Allergies:  Review of patient's allergies indicates:  No Known Allergies    Medications:  Current Outpatient Medications   Medication Sig Dispense Refill    abiraterone (ZYTIGA) 500 mg Tab Take 2 tablets (1,000 mg total) by mouth once daily. 60 tablet 6    predniSONE (DELTASONE) 5 MG tablet Take 1 tablet (5 mg total) by mouth once daily. 30 tablet 6    tamsulosin (FLOMAX) 0.4 mg Cap Take 1 capsule (0.4 mg total) by mouth once daily. 90 capsule 0     No current facility-administered medications for this visit.       Review of Systems   Constitutional:  Negative for appetite change, chills, diaphoresis, fatigue, fever and unexpected weight change.   HENT:  Negative for mouth sores.    Eyes:  Negative for visual disturbance.   Respiratory:  Negative for cough and shortness of breath.    Cardiovascular:  Negative for chest pain and palpitations.   Gastrointestinal:  Negative for abdominal pain, constipation, diarrhea, nausea and vomiting.   Genitourinary:  Negative for frequency.   Musculoskeletal:  Positive for back pain.        Left  "shoulder pain   Skin:  Negative for color change and rash.   Neurological:  Negative for headaches.   Hematological:  Negative for adenopathy. Does not bruise/bleed easily.   Psychiatric/Behavioral:  The patient is not nervous/anxious.        ECOG Performance Status: 1   Objective:      Vitals:   Vitals:    01/08/25 0826   BP: 130/80   BP Location: Right arm   Patient Position: Sitting   Pulse: 75   Resp: 14   Temp: 97.5 °F (36.4 °C)   TempSrc: Temporal   Weight: 75.4 kg (166 lb 3.6 oz)   Height: 5' 8.5" (1.74 m)           Physical Exam  Constitutional:       General: He is not in acute distress.     Appearance: He is well-developed. He is not diaphoretic.   HENT:      Head: Normocephalic and atraumatic.   Cardiovascular:      Rate and Rhythm: Normal rate and regular rhythm.      Heart sounds: Normal heart sounds. No murmur heard.     No friction rub. No gallop.   Pulmonary:      Effort: Pulmonary effort is normal. No respiratory distress.      Breath sounds: Normal breath sounds. No wheezing or rales.   Chest:      Chest wall: No tenderness.   Abdominal:      General: Bowel sounds are normal. There is no distension.      Palpations: Abdomen is soft. There is no mass.      Tenderness: There is no abdominal tenderness. There is no rebound.   Musculoskeletal:      Cervical back: Normal range of motion.   Lymphadenopathy:      Cervical: No cervical adenopathy.      Upper Body:      Right upper body: No supraclavicular or axillary adenopathy.      Left upper body: No supraclavicular or axillary adenopathy.   Skin:     General: Skin is warm and dry.      Findings: No erythema or rash.   Neurological:      Mental Status: He is alert and oriented to person, place, and time.   Psychiatric:         Behavior: Behavior normal.         Laboratory Data:  Lab Visit on 01/06/2025   Component Date Value Ref Range Status    WBC 01/06/2025 5.77  3.90 - 12.70 K/uL Final    RBC 01/06/2025 4.53 (L)  4.60 - 6.20 M/uL Final    Hemoglobin " 01/06/2025 13.9 (L)  14.0 - 18.0 g/dL Final    Hematocrit 01/06/2025 40.7  40.0 - 54.0 % Final    MCV 01/06/2025 90  82 - 98 fL Final    MCH 01/06/2025 30.7  27.0 - 31.0 pg Final    MCHC 01/06/2025 34.2  32.0 - 36.0 g/dL Final    RDW 01/06/2025 12.2  11.5 - 14.5 % Final    Platelets 01/06/2025 231  150 - 450 K/uL Final    MPV 01/06/2025 9.7  9.2 - 12.9 fL Final    Immature Granulocytes 01/06/2025 0.2  0.0 - 0.5 % Final    Gran # (ANC) 01/06/2025 3.3  1.8 - 7.7 K/uL Final    Immature Grans (Abs) 01/06/2025 0.01  0.00 - 0.04 K/uL Final    Comment: Mild elevation in immature granulocytes is non specific and   can be seen in a variety of conditions including stress response,   acute inflammation, trauma and pregnancy. Correlation with other   laboratory and clinical findings is essential.      Lymph # 01/06/2025 1.6  1.0 - 4.8 K/uL Final    Mono # 01/06/2025 0.7  0.3 - 1.0 K/uL Final    Eos # 01/06/2025 0.1  0.0 - 0.5 K/uL Final    Baso # 01/06/2025 0.07  0.00 - 0.20 K/uL Final    nRBC 01/06/2025 0  0 /100 WBC Final    Gran % 01/06/2025 57.3  38.0 - 73.0 % Final    Lymph % 01/06/2025 27.4  18.0 - 48.0 % Final    Mono % 01/06/2025 11.6  4.0 - 15.0 % Final    Eosinophil % 01/06/2025 2.3  0.0 - 8.0 % Final    Basophil % 01/06/2025 1.2  0.0 - 1.9 % Final    Differential Method 01/06/2025 Automated   Final    Sodium 01/06/2025 139  136 - 145 mmol/L Final    Potassium 01/06/2025 3.6  3.5 - 5.1 mmol/L Final    Chloride 01/06/2025 106  95 - 110 mmol/L Final    CO2 01/06/2025 25  23 - 29 mmol/L Final    Glucose 01/06/2025 85  70 - 110 mg/dL Final    BUN 01/06/2025 16  8 - 23 mg/dL Final    Creatinine 01/06/2025 0.8  0.5 - 1.4 mg/dL Final    Calcium 01/06/2025 9.0  8.7 - 10.5 mg/dL Final    Total Protein 01/06/2025 6.7  6.0 - 8.4 g/dL Final    Albumin 01/06/2025 3.8  3.5 - 5.2 g/dL Final    Total Bilirubin 01/06/2025 0.3  0.1 - 1.0 mg/dL Final    Comment: For infants and newborns, interpretation of results should be based  on  gestational age, weight and in agreement with clinical  observations.    Premature Infant recommended reference ranges:  Up to 24 hours.............<8.0 mg/dL  Up to 48 hours............<12.0 mg/dL  3-5 days..................<15.0 mg/dL  6-29 days.................<15.0 mg/dL      Alkaline Phosphatase 01/06/2025 67  40 - 150 U/L Final    AST 01/06/2025 22  10 - 40 U/L Final    ALT 01/06/2025 21  10 - 44 U/L Final    eGFR 01/06/2025 >60.0  >60 mL/min/1.73 m^2 Final    Anion Gap 01/06/2025 8  8 - 16 mmol/L Final    PSA Diagnostic 01/06/2025 3.0  0.00 - 4.00 ng/mL Final    Comment: The testing method is a chemiluminescent microparticle immunoassay   manufactured by [employer] and performed on the [employer]   or   [employer] system. Values obtained with different assay manufacturers   for   methods may be different and cannot be used interchangeably.  PSA Expected levels:  Hormonal Therapy: <0.05 ng/ml  Prostatectomy: <0.01 ng/ml  Radiation Therapy: <1.00 ng/ml           Imaging:     MRI Prostate 9/06/24  Image quality is degraded by motion artifact, limiting assessment. There is significant motion artifact on small field of view axial T2 sequence which is used to segment the prostate gland on [employer].     Prostate: 5.0 x 5.0 x 5.9 cm corresponding to a computed volume of 57 cc.     Peripheral/transition zone: Large PI-RADS 5 lesion involving the majority of the prostate gland centered in the base and mid gland however and also involves the apex. Lesion involves both the transition and peripheral zones. There is extracapsular extension, and involvement of local structures as detailed below.     Lesion (KHADIJAH) #T-1     Greatest dimension: 5.5 cm (series 5, image 16)     T2-WI: Lenticular or non-circumscribed, homogeneous,moderately hypointense, and >1.5 cm in greatest dimension, score 5.     DWI/ADC: Same as 4 but ?1.5 cm in greatest dimension or definite extraprostatic extension/invasive behavior, score 5.      DCE: Positive     Extraprostatic extension: Positive     PI-RADS assessment category: 5     Neurovascular bundle: Suspected involvement, however motion artifact obscures this region limiting assessment.     Seminal vesicles: Abnormal restricted diffusion/tumor signal extends to involve the seminal vesicles.     Adjacent Organ Involvement: Prostate lesion appears to invade through the base of the bladder extending into the bladder lumen (series 2, image 22).     Lymphadenopathy: Multiple enlarged bilateral iliac chain lymph nodes concerning for metastasis. Right external iliac chain lymph node measures 2.1 cm (series 3, image 14). Left external iliac chain lymph node measures 2 cm (series 3, image 12). Left internal iliac chain lymph node measures 1.5 cm (series 3, image 12).     Other Findings: Multiple marrow replacing, enhancing lesions involving the right pubic symphysis and inferior pubic ramus concerning for metastasis. Mild diffuse bladder wall thickening with trabeculation and multiple small diverticula, likely sequela of chronic outlet obstruction.    PSMA PET/CT 10/04/24  Physiologic distribution of tracer within the salivary and lacrimal glands, blood pool, liver, spleen, pancreas, ganglia, bone marrow, bowel, kidneys, and urinary tract.     Prostate bed: The prostate gland is markedly enlarged and demonstrates diffuse radiotracer uptake with an SUV max of 58.     Lymph nodes: There is a metastatic right pelvic lymph node best visualized on image 268 which measures 2.8 x 1.4 cm with an SUV max of 55. There is a left internal iliac node measuring 1.9 x 1.2 cm with an SUV max of 41. There is a left external iliac node measuring 2.5 x 1.8 cm best visualized on image 258 with an SUV max of 46.     Skeleton: There is an infiltrative lesion in the right inferior pubic ramus extending into the pubic symphysis with an SUV max of 65. There is a nodular focus of radiotracer accumulation in the left inferior pubic  ramus best visualized on image 291 with an SUV max of 12. There is a punctate focus of radiotracer accumulation in the posterior right iliac bone best visualized on image 255 with an SUV max of 3.3. There is a nodular focus of radiotracer accumulation within the left lamina of S1 best visualized on image 250 with an SUV max of 3.6.       Assessment:       1. Prostate cancer    2. Metastasis to bone    3. Normocytic anemia    4. Arthritis               Plan:     Prostate Cancer - the patient with metastatic castrate sensitive prostate cancer with lesions in pelvic lymph nodes, right inferior veer pubic ramus, left inferior pubic ramus, right iliac bone, and lamina of S1  -The patient does not have high volume disease outside of the pelvis in his a candidate for abiraterone and prednisone along with Lupron  -Invitae returned negative for germline mutations tested  -TEMPUS blood was negative.  TEMPUS tissue testing showed SPOP, HDAC2, and LRP1B mutations  -Lupron started 11/11/24  -PSA down to 3ng/mL on 1/06/25  -Lupron due next 2/11/25  -Will repeat PSMA PET-CT 6 months after initiation of therapy to assess ability to add radiation treatment  Visit today included increased complexity associated with the care of the episodic problem (ADT and Abiratoerone/prednisone ) addressed and managing the longitudinal care of the patient due to the serious and/or complex managed problem(s) prostate cancer.    Bone Metastases - Pt candidate for Xgeva  -Pt to start on Calcium 1,000mg daily and Vitamin D 800IU daily  -Pt to get dental clearance    Anemia - hemoglobin 123.9g/dL  -Likely due to ADT and chronic diseease  -will monitor    Arthritis - in the left shoulder   -May be due to ADT  -Pt instructed to take tylenol  -Will monitor    Route Chart for Scheduling    Med Onc Chart Routing      Follow up with physician 1 month. Pt needs approval xgeva.  Pt needs dental clearance prior. Pt needs a CBC and CMP on 2/10/25 prior to his  return appt with me on 2/11/25.   Follow up with FELIPE    Infusion scheduling note    Injection scheduling note    Labs    Imaging    Pharmacy appointment    Other referrals                Supportive Plan Information  OP PROSTATE LEUPROLIDE Q3MO Jose Alberto Daniel MD   Associated Diagnosis: Prostate cancer Stage IVB cTX, cN1, pM1b, Grade Group: 5 noted on 10/18/2024   Line of treatment: Supportive Care   Treatment goal: Palliative     Upcoming Treatment Dates - OP PROSTATE LEUPROLIDE Q3MO    1/27/2025       Chemotherapy       leuprolide (LUPRON) injection 22.5 mg  4/21/2025       Chemotherapy       leuprolide (LUPRON) injection 22.5 mg  7/14/2025       Chemotherapy       leuprolide (LUPRON) injection 22.5 mg  10/6/2025       Chemotherapy       leuprolide (LUPRON) injection 22.5 mg      Jose Alberto Daniel MD  [facility]  Hematology and Oncology  [facility]   [address] [place] [address]   O: [phone]  F: [phone]

## 2025-02-04 ENCOUNTER — TELEPHONE (OUTPATIENT)
Dept: HEMATOLOGY/ONCOLOGY | Facility: CLINIC | Age: 60
End: 2025-02-04
Payer: MEDICARE

## 2025-02-04 NOTE — TELEPHONE ENCOUNTER
----- Message from Alberta sent at 2/4/2025  1:40 PM CST -----  Type: Needs Medical Advice  Who Called:  Patient   Symptoms (please be specific):    How long has patient had these symptoms:    Pharmacy name and phone #:    Best Call Back Number:   Additional Information: Patient is requesting a call back regarding an injection.

## 2025-02-04 NOTE — TELEPHONE ENCOUNTER
Patient advised I did receive dental clearance last week and will notifiy infusion ok to proceed next week.

## 2025-02-10 ENCOUNTER — LAB VISIT (OUTPATIENT)
Dept: LAB | Facility: HOSPITAL | Age: 60
End: 2025-02-10
Attending: INTERNAL MEDICINE
Payer: MEDICARE

## 2025-02-10 DIAGNOSIS — C61 PROSTATE CANCER: ICD-10-CM

## 2025-02-10 LAB
ALBUMIN SERPL BCP-MCNC: 3.6 G/DL (ref 3.5–5.2)
ALP SERPL-CCNC: 59 U/L (ref 40–150)
ALT SERPL W/O P-5'-P-CCNC: 22 U/L (ref 10–44)
ANION GAP SERPL CALC-SCNC: 7 MMOL/L (ref 8–16)
AST SERPL-CCNC: 21 U/L (ref 10–40)
BASOPHILS # BLD AUTO: 0.06 K/UL (ref 0–0.2)
BASOPHILS NFR BLD: 1 % (ref 0–1.9)
BILIRUB SERPL-MCNC: 0.4 MG/DL (ref 0.1–1)
BUN SERPL-MCNC: 18 MG/DL (ref 8–23)
CALCIUM SERPL-MCNC: 9 MG/DL (ref 8.7–10.5)
CHLORIDE SERPL-SCNC: 107 MMOL/L (ref 95–110)
CO2 SERPL-SCNC: 26 MMOL/L (ref 23–29)
CREAT SERPL-MCNC: 0.8 MG/DL (ref 0.5–1.4)
DIFFERENTIAL METHOD BLD: ABNORMAL
EOSINOPHIL # BLD AUTO: 0.1 K/UL (ref 0–0.5)
EOSINOPHIL NFR BLD: 1.9 % (ref 0–8)
ERYTHROCYTE [DISTWIDTH] IN BLOOD BY AUTOMATED COUNT: 12.2 % (ref 11.5–14.5)
EST. GFR  (NO RACE VARIABLE): >60 ML/MIN/1.73 M^2
GLUCOSE SERPL-MCNC: 96 MG/DL (ref 70–110)
HCT VFR BLD AUTO: 38.9 % (ref 40–54)
HGB BLD-MCNC: 13.3 G/DL (ref 14–18)
IMM GRANULOCYTES # BLD AUTO: 0.01 K/UL (ref 0–0.04)
IMM GRANULOCYTES NFR BLD AUTO: 0.2 % (ref 0–0.5)
LYMPHOCYTES # BLD AUTO: 1.9 K/UL (ref 1–4.8)
LYMPHOCYTES NFR BLD: 32.5 % (ref 18–48)
MCH RBC QN AUTO: 30.6 PG (ref 27–31)
MCHC RBC AUTO-ENTMCNC: 34.2 G/DL (ref 32–36)
MCV RBC AUTO: 90 FL (ref 82–98)
MONOCYTES # BLD AUTO: 0.7 K/UL (ref 0.3–1)
MONOCYTES NFR BLD: 11.6 % (ref 4–15)
NEUTROPHILS # BLD AUTO: 3.1 K/UL (ref 1.8–7.7)
NEUTROPHILS NFR BLD: 52.8 % (ref 38–73)
NRBC BLD-RTO: 0 /100 WBC
PLATELET # BLD AUTO: 228 K/UL (ref 150–450)
PMV BLD AUTO: 9.7 FL (ref 9.2–12.9)
POTASSIUM SERPL-SCNC: 3.6 MMOL/L (ref 3.5–5.1)
PROT SERPL-MCNC: 6.4 G/DL (ref 6–8.4)
RBC # BLD AUTO: 4.34 M/UL (ref 4.6–6.2)
SODIUM SERPL-SCNC: 140 MMOL/L (ref 136–145)
WBC # BLD AUTO: 5.85 K/UL (ref 3.9–12.7)

## 2025-02-10 PROCEDURE — 85025 COMPLETE CBC W/AUTO DIFF WBC: CPT | Mod: PN | Performed by: INTERNAL MEDICINE

## 2025-02-10 PROCEDURE — 80053 COMPREHEN METABOLIC PANEL: CPT | Mod: PN | Performed by: INTERNAL MEDICINE

## 2025-02-10 PROCEDURE — 36415 COLL VENOUS BLD VENIPUNCTURE: CPT | Mod: PN | Performed by: INTERNAL MEDICINE

## 2025-02-10 NOTE — PROGRESS NOTES
PATIENT: Ronald Martinez  MRN: [id number]  DATE: 2/11/2025      Diagnosis:   1. Prostate cancer    2. Metastasis to bone    3. Normocytic anemia    4. Acute pain of left shoulder              Chief Complaint: Prostate Cancer (1 month follow up)      Oncologic History:      Oncologic History     Oncologic Treatment     Pathology           Subjective:    Interval History: Mr. Martinez is a 59 y.o. male presets for metastatic prostate cancer.  Since the last clinic visit the patient endorses continued left shoulder pain.  The patient denies CP, cough, SOB, abdominal pain, nausea, vomiting, constipation, diarrhea.  The patient denies fever, chills, night sweats, weight loss, new lumps or bumps, easy bruising or bleeding.    Prior History:  PSA on 10/20/2023 was 64.9 ng/mL.  The patient underwent MRI prostate 09/06/2024 showing a large lesion involving the majority of the prostate gland.  The patient underwent prostate biopsy on 09/27/2024 showing prostatic adenocarcinoma [place] score 4+5=9 grade group 5.  Patient underwent PSMA PET-CT on 10/04/2024 showing markedly enlarged prostate gland with diffuse radiotracer uptake; metastatic right pelvic lymph node measuring 2.8 x 1.4 cm; left internal iliac node measuring 1.9 x 1.2 cm; left external iliac lymph node measuring 2.5 x 1.5 cm; infiltrative lesion in the right inferior pubic ramus extending into the pubic symphysis; nodular focus of radiotracer accumulation in the left inferior pubic ramus; punctate focus of radiotracer accumulation in the right iliac bone; nodular focus of radiotracer accumulation within the left lamina of S1.    PSA on 10/18/24 was 213ng/mL.  Invitae returned negative for germline mutations tested.  TEMPUS blood was negative.  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   PSA was 3ng/mL on 1/06/25.    Past Medical History:   Past Medical History:   Diagnosis Date    Cancer     prostate       Past Surgical HIstory:   Past Surgical  History:   Procedure Laterality Date    CYST REMOVAL      TRANSRECTAL BIOPSY OF PROSTATE WITH ULTRASOUND GUIDANCE N/A 9/27/2024    Procedure: BIOPSY, PROSTATE, RECTAL APPROACH, WITH US GUIDANCE;  Surgeon: ANDREA Haas MD;  Location: [facility];  Service: Urology;  Laterality: N/A;       Family History:   Family History   Problem Relation Name Age of Onset    Atrial fibrillation Mother      Deep vein thrombosis Father      Heart attack Father      Kidney nephrosis Brother         Social History:  reports that he quit smoking about 50 years ago. His smoking use included cigarettes. He started smoking about 44 years ago. He has never used smokeless tobacco. He reports that he does not currently use alcohol after a past usage of about 7.0 standard drinks of alcohol per week. He reports that he does not currently use drugs.    Allergies:  Review of patient's allergies indicates:  No Known Allergies    Medications:  Current Outpatient Medications   Medication Sig Dispense Refill    abiraterone (ZYTIGA) 500 mg Tab Take 2 tablets (1,000 mg total) by mouth once daily. 60 tablet 6    calcium-vitamin D3 (OS-GORDON 500 + D3) 500 mg-5 mcg (200 unit) per tablet Take 1 tablet by mouth 2 (two) times daily with meals.      predniSONE (DELTASONE) 5 MG tablet Take 1 tablet (5 mg total) by mouth once daily. 30 tablet 6    tamsulosin (FLOMAX) 0.4 mg Cap Take 1 capsule (0.4 mg total) by mouth once daily. 90 capsule 0     No current facility-administered medications for this visit.       Review of Systems   Constitutional:  Negative for appetite change, chills, diaphoresis, fatigue, fever and unexpected weight change.   Respiratory:  Negative for cough and shortness of breath.    Cardiovascular:  Negative for chest pain and palpitations.   Gastrointestinal:  Negative for abdominal pain, constipation, diarrhea, nausea and vomiting.   Musculoskeletal:         Left shoulder pain   Skin:  Negative for color change and rash.   Neurological:   "Negative for headaches.   Hematological:  Negative for adenopathy. Does not bruise/bleed easily.   Psychiatric/Behavioral:  The patient is not nervous/anxious.        ECOG Performance Status: 1   Objective:      Vitals:   Vitals:    02/11/25 0952   BP: 132/80   BP Location: Right arm   Patient Position: Sitting   Pulse: 74   Resp: 16   Temp: 97.8 °F (36.6 °C)   TempSrc: Temporal   SpO2: 98%   Weight: 77.8 kg (171 lb 8.3 oz)   Height: 5' 8.5" (1.74 m)             Physical Exam  Constitutional:       General: He is not in acute distress.     Appearance: He is well-developed. He is not diaphoretic.   HENT:      Head: Normocephalic and atraumatic.   Cardiovascular:      Rate and Rhythm: Normal rate and regular rhythm.      Heart sounds: Normal heart sounds. No murmur heard.     No friction rub. No gallop.   Pulmonary:      Effort: Pulmonary effort is normal. No respiratory distress.      Breath sounds: Normal breath sounds. No wheezing or rales.   Chest:      Chest wall: No tenderness.   Abdominal:      General: Bowel sounds are normal. There is no distension.      Palpations: Abdomen is soft. There is no mass.      Tenderness: There is no abdominal tenderness. There is no rebound.   Musculoskeletal:      Cervical back: Normal range of motion.   Lymphadenopathy:      Cervical: No cervical adenopathy.      Upper Body:      Right upper body: No supraclavicular or axillary adenopathy.      Left upper body: No supraclavicular or axillary adenopathy.   Skin:     General: Skin is warm and dry.      Findings: No erythema or rash.   Neurological:      Mental Status: He is alert and oriented to person, place, and time.   Psychiatric:         Behavior: Behavior normal.         Laboratory Data:  Lab Visit on 02/10/2025   Component Date Value Ref Range Status    WBC 02/10/2025 5.85  3.90 - 12.70 K/uL Final    RBC 02/10/2025 4.34 (L)  4.60 - 6.20 M/uL Final    Hemoglobin 02/10/2025 13.3 (L)  14.0 - 18.0 g/dL Final    Hematocrit " 02/10/2025 38.9 (L)  40.0 - 54.0 % Final    MCV 02/10/2025 90  82 - 98 fL Final    MCH 02/10/2025 30.6  27.0 - 31.0 pg Final    MCHC 02/10/2025 34.2  32.0 - 36.0 g/dL Final    RDW 02/10/2025 12.2  11.5 - 14.5 % Final    Platelets 02/10/2025 228  150 - 450 K/uL Final    MPV 02/10/2025 9.7  9.2 - 12.9 fL Final    Immature Granulocytes 02/10/2025 0.2  0.0 - 0.5 % Final    Gran # (ANC) 02/10/2025 3.1  1.8 - 7.7 K/uL Final    Immature Grans (Abs) 02/10/2025 0.01  0.00 - 0.04 K/uL Final    Comment: Mild elevation in immature granulocytes is non specific and   can be seen in a variety of conditions including stress response,   acute inflammation, trauma and pregnancy. Correlation with other   laboratory and clinical findings is essential.      Lymph # 02/10/2025 1.9  1.0 - 4.8 K/uL Final    Mono # 02/10/2025 0.7  0.3 - 1.0 K/uL Final    Eos # 02/10/2025 0.1  0.0 - 0.5 K/uL Final    Baso # 02/10/2025 0.06  0.00 - 0.20 K/uL Final    nRBC 02/10/2025 0  0 /100 WBC Final    Gran % 02/10/2025 52.8  38.0 - 73.0 % Final    Lymph % 02/10/2025 32.5  18.0 - 48.0 % Final    Mono % 02/10/2025 11.6  4.0 - 15.0 % Final    Eosinophil % 02/10/2025 1.9  0.0 - 8.0 % Final    Basophil % 02/10/2025 1.0  0.0 - 1.9 % Final    Differential Method 02/10/2025 Automated   Final    Sodium 02/10/2025 140  136 - 145 mmol/L Final    Potassium 02/10/2025 3.6  3.5 - 5.1 mmol/L Final    Chloride 02/10/2025 107  95 - 110 mmol/L Final    CO2 02/10/2025 26  23 - 29 mmol/L Final    Glucose 02/10/2025 96  70 - 110 mg/dL Final    BUN 02/10/2025 18  8 - 23 mg/dL Final    Creatinine 02/10/2025 0.8  0.5 - 1.4 mg/dL Final    Calcium 02/10/2025 9.0  8.7 - 10.5 mg/dL Final    Total Protein 02/10/2025 6.4  6.0 - 8.4 g/dL Final    Albumin 02/10/2025 3.6  3.5 - 5.2 g/dL Final    Total Bilirubin 02/10/2025 0.4  0.1 - 1.0 mg/dL Final    Comment: For infants and newborns, interpretation of results should be based  on gestational age, weight and in agreement with  clinical  observations.    Premature Infant recommended reference ranges:  Up to 24 hours.............<8.0 mg/dL  Up to 48 hours............<12.0 mg/dL  3-5 days..................<15.0 mg/dL  6-29 days.................<15.0 mg/dL      Alkaline Phosphatase 02/10/2025 59  40 - 150 U/L Final    AST 02/10/2025 21  10 - 40 U/L Final    ALT 02/10/2025 22  10 - 44 U/L Final    eGFR 02/10/2025 >60.0  >60 mL/min/1.73 m^2 Final    Anion Gap 02/10/2025 7 (L)  8 - 16 mmol/L Final         Imaging:     MRI Prostate 9/06/24  Image quality is degraded by motion artifact, limiting assessment. There is significant motion artifact on small field of view axial T2 sequence which is used to segment the prostate gland on Precipio[employer]CAD.     Prostate: 5.0 x 5.0 x 5.9 cm corresponding to a computed volume of 57 cc.     Peripheral/transition zone: Large PI-RADS 5 lesion involving the majority of the prostate gland centered in the base and mid gland however and also involves the apex. Lesion involves both the transition and peripheral zones. There is extracapsular extension, and involvement of local structures as detailed below.     Lesion (KHADIJAH) #T-1     Greatest dimension: 5.5 cm (series 5, image 16)     T2-WI: Lenticular or non-circumscribed, homogeneous,moderately hypointense, and >1.5 cm in greatest dimension, score 5.     DWI/ADC: Same as 4 but ?1.5 cm in greatest dimension or definite extraprostatic extension/invasive behavior, score 5.     DCE: Positive     Extraprostatic extension: Positive     PI-RADS assessment category: 5     Neurovascular bundle: Suspected involvement, however motion artifact obscures this region limiting assessment.     Seminal vesicles: Abnormal restricted diffusion/tumor signal extends to involve the seminal vesicles.     Adjacent Organ Involvement: Prostate lesion appears to invade through the base of the bladder extending into the bladder lumen (series 2, image 22).     Lymphadenopathy: Multiple enlarged bilateral  iliac chain lymph nodes concerning for metastasis. Right external iliac chain lymph node measures 2.1 cm (series 3, image 14). Left external iliac chain lymph node measures 2 cm (series 3, image 12). Left internal iliac chain lymph node measures 1.5 cm (series 3, image 12).     Other Findings: Multiple marrow replacing, enhancing lesions involving the right pubic symphysis and inferior pubic ramus concerning for metastasis. Mild diffuse bladder wall thickening with trabeculation and multiple small diverticula, likely sequela of chronic outlet obstruction.    PSMA PET/CT 10/04/24  Physiologic distribution of tracer within the salivary and lacrimal glands, blood pool, liver, spleen, pancreas, ganglia, bone marrow, bowel, kidneys, and urinary tract.     Prostate bed: The prostate gland is markedly enlarged and demonstrates diffuse radiotracer uptake with an SUV max of 58.     Lymph nodes: There is a metastatic right pelvic lymph node best visualized on image 268 which measures 2.8 x 1.4 cm with an SUV max of 55. There is a left internal iliac node measuring 1.9 x 1.2 cm with an SUV max of 41. There is a left external iliac node measuring 2.5 x 1.8 cm best visualized on image 258 with an SUV max of 46.     Skeleton: There is an infiltrative lesion in the right inferior pubic ramus extending into the pubic symphysis with an SUV max of 65. There is a nodular focus of radiotracer accumulation in the left inferior pubic ramus best visualized on image 291 with an SUV max of 12. There is a punctate focus of radiotracer accumulation in the posterior right iliac bone best visualized on image 255 with an SUV max of 3.3. There is a nodular focus of radiotracer accumulation within the left lamina of S1 best visualized on image 250 with an SUV max of 3.6.       Assessment:       1. Prostate cancer    2. Metastasis to bone    3. Normocytic anemia    4. Acute pain of left shoulder                 Plan:     Prostate Cancer - the  patient with metastatic castrate sensitive prostate cancer with lesions in pelvic lymph nodes, right inferior veer pubic ramus, left inferior pubic ramus, right iliac bone, and lamina of S1  -The patient does not have high volume disease outside of the pelvis in his a candidate for abiraterone and prednisone along with Lupron  -Invitae returned negative for germline mutations tested  -TEMPUS blood was negative.  TEMPUS tissue testing showed SPOP, HDAC2, and LRP1B mutations  -Lupron started 11/11/24  -PSA down to 3ng/mL on 1/06/25  -Lupron to be given today  -Will repeat PSMA PET-CT in 3 months to assess ability to add radiation treatment  -Pt to undergo monthly CBC and CMP  Visit today included increased complexity associated with the care of the episodic problem (ADT and Abiratoerone/prednisone ) addressed and managing the longitudinal care of the patient due to the serious and/or complex managed problem(s) prostate cancer.    Bone Metastases - Pt candidate for Xgeva  -Pt on Calcium 1,000mg daily and Vitamin D 800IU daily  -Pt cleared by dentist for Xgeva    Anemia - hemoglobin 13.3g/dL  -Likely due to ADT and chronic diseease  -Stable  -will monitor    Left Shoulder Pain - pt to see orthopedics    Route Chart for Scheduling    Med Onc Chart Routing      Follow up with physician 3 months. Pt can proceed with lupron and xgeva.  Pt needs a CBC and CMP in 4 weeks and 8 weeks.  Pt needs CBC, CMp, PSA and PSMa PET/CT in 12 weeks with an appt with me the day after labs and scan with lupron and xgeva that day.  Pt needs an appt with Dr Hills at that time as well.   Follow up with FELIPE    Infusion scheduling note    Injection scheduling note    Labs    Imaging    Pharmacy appointment    Other referrals                Supportive Plan Information  OP PROSTATE LEUPROLIDE Q3MO Jose Alebrto Daniel MD   Associated Diagnosis: Prostate cancer Stage IVB cTX, cN1, pM1b, Grade Group: 5 noted on 10/18/2024   Line of treatment: Supportive  Care   Treatment goal: Palliative     Upcoming Treatment Dates - OP PROSTATE LEUPROLIDE Q3MO    4/21/2025       Chemotherapy       leuprolide (LUPRON) injection 22.5 mg  7/14/2025       Chemotherapy       leuprolide (LUPRON) injection 22.5 mg  10/6/2025       Chemotherapy       leuprolide (LUPRON) injection 22.5 mg  12/29/2025       Chemotherapy       leuprolide (LUPRON) injection 22.5 mg      Jose Alberto Daniel MD  [facility]  Hematology and Oncology  [facility]   [address] [facility] [place] [address]   O: [phone]  F: [phone]

## 2025-02-11 ENCOUNTER — INFUSION (OUTPATIENT)
Dept: INFUSION THERAPY | Facility: HOSPITAL | Age: 60
End: 2025-02-11
Attending: INTERNAL MEDICINE
Payer: MEDICARE

## 2025-02-11 ENCOUNTER — OFFICE VISIT (OUTPATIENT)
Dept: HEMATOLOGY/ONCOLOGY | Facility: CLINIC | Age: 60
End: 2025-02-11
Payer: MEDICARE

## 2025-02-11 ENCOUNTER — PATIENT MESSAGE (OUTPATIENT)
Dept: HEMATOLOGY/ONCOLOGY | Facility: CLINIC | Age: 60
End: 2025-02-11
Payer: MEDICARE

## 2025-02-11 VITALS
SYSTOLIC BLOOD PRESSURE: 132 MMHG | HEART RATE: 74 BPM | RESPIRATION RATE: 16 BRPM | TEMPERATURE: 98 F | DIASTOLIC BLOOD PRESSURE: 80 MMHG | OXYGEN SATURATION: 98 %

## 2025-02-11 VITALS
HEART RATE: 74 BPM | HEIGHT: 69 IN | DIASTOLIC BLOOD PRESSURE: 80 MMHG | RESPIRATION RATE: 16 BRPM | SYSTOLIC BLOOD PRESSURE: 132 MMHG | BODY MASS INDEX: 25.4 KG/M2 | TEMPERATURE: 98 F | OXYGEN SATURATION: 98 % | WEIGHT: 171.5 LBS

## 2025-02-11 DIAGNOSIS — C61 PROSTATE CANCER: Primary | ICD-10-CM

## 2025-02-11 DIAGNOSIS — D64.9 NORMOCYTIC ANEMIA: ICD-10-CM

## 2025-02-11 DIAGNOSIS — C79.51 METASTASIS TO BONE: ICD-10-CM

## 2025-02-11 DIAGNOSIS — M25.512 ACUTE PAIN OF LEFT SHOULDER: ICD-10-CM

## 2025-02-11 PROCEDURE — 99214 OFFICE O/P EST MOD 30 MIN: CPT | Mod: PBBFAC,25,PN | Performed by: INTERNAL MEDICINE

## 2025-02-11 PROCEDURE — 96372 THER/PROPH/DIAG INJ SC/IM: CPT | Mod: 59,PN

## 2025-02-11 PROCEDURE — 63600175 PHARM REV CODE 636 W HCPCS: Mod: JZ,TB,PN | Performed by: INTERNAL MEDICINE

## 2025-02-11 PROCEDURE — G2211 COMPLEX E/M VISIT ADD ON: HCPCS | Mod: S$PBB,,, | Performed by: INTERNAL MEDICINE

## 2025-02-11 PROCEDURE — 99215 OFFICE O/P EST HI 40 MIN: CPT | Mod: S$PBB,,, | Performed by: INTERNAL MEDICINE

## 2025-02-11 PROCEDURE — 96402 CHEMO HORMON ANTINEOPL SQ/IM: CPT | Mod: PN

## 2025-02-11 PROCEDURE — 99999 PR PBB SHADOW E&M-EST. PATIENT-LVL IV: CPT | Mod: PBBFAC,,, | Performed by: INTERNAL MEDICINE

## 2025-02-11 RX ORDER — PSYLLIUM HUSK 0.4 G
1 CAPSULE ORAL 2 TIMES DAILY WITH MEALS
COMMUNITY

## 2025-02-11 RX ADMIN — LEUPROLIDE ACETATE 22.5 MG: KIT at 11:02

## 2025-02-11 RX ADMIN — DENOSUMAB 120 MG: 120 INJECTION SUBCUTANEOUS at 11:02

## 2025-02-12 DIAGNOSIS — M25.512 LEFT SHOULDER PAIN, UNSPECIFIED CHRONICITY: Primary | ICD-10-CM

## 2025-02-19 ENCOUNTER — TELEPHONE (OUTPATIENT)
Dept: HEMATOLOGY/ONCOLOGY | Facility: CLINIC | Age: 60
End: 2025-02-19
Payer: MEDICARE

## 2025-02-19 NOTE — TELEPHONE ENCOUNTER
Ruslank wit pt about attending the upcoming SMV program. I explained in detail what the program was about. Pt verbalized understanding and accepted date/time. Location was discussed and confirmation was sent to pts portal.

## 2025-02-20 ENCOUNTER — PATIENT MESSAGE (OUTPATIENT)
Dept: HEMATOLOGY/ONCOLOGY | Facility: CLINIC | Age: 60
End: 2025-02-20
Payer: MEDICARE

## 2025-02-20 ENCOUNTER — TELEPHONE (OUTPATIENT)
Dept: HEMATOLOGY/ONCOLOGY | Facility: CLINIC | Age: 60
End: 2025-02-20
Payer: MEDICARE

## 2025-02-20 NOTE — NURSING
Chart reviewed. Mr. Martinez has recently started xgeva for bone health. He remains on Lupron/abiraterone. Most recent PSA is 3.0, down from 213.0 4 months ago.     He complains of widespread chronic body aches/pain. We briefly discussed palliative care - Mr. Cardenas has not yet met with palliative at this time.     He previously met with Dr. Hills to discuss palliative radiation therapy and may be interested in this after his repeat PET scan in May.     He is thrilled with the decrease in his PSA levels and is hopeful that his PET scan will show good results.     He is scheduled to come to the next prostate SMV and is looking forward to this visit.     I informed him that I will discuss palliative care with Dr. Daniel and his clinic staff. I encouraged him to reach out with any further questions/concerns. He v/u and thanked me for calling.     InPlaySayg sent to Dr. Daniel regarding palliative care consult.

## 2025-02-21 ENCOUNTER — PATIENT MESSAGE (OUTPATIENT)
Dept: HEMATOLOGY/ONCOLOGY | Facility: CLINIC | Age: 60
End: 2025-02-21
Payer: MEDICARE

## 2025-02-24 ENCOUNTER — TELEPHONE (OUTPATIENT)
Dept: HEMATOLOGY/ONCOLOGY | Facility: CLINIC | Age: 60
End: 2025-02-24
Payer: MEDICARE

## 2025-02-24 NOTE — TELEPHONE ENCOUNTER
Spoke with patient to remind him of the SMV on 2/25/25. Patient verbalized understanding, confirmed day/time, location was discussed. Patient thanked me for calling.

## 2025-02-25 ENCOUNTER — TELEPHONE (OUTPATIENT)
Dept: HEMATOLOGY/ONCOLOGY | Facility: CLINIC | Age: 60
End: 2025-02-25
Payer: MEDICARE

## 2025-02-25 ENCOUNTER — OFFICE VISIT (OUTPATIENT)
Dept: HEMATOLOGY/ONCOLOGY | Facility: CLINIC | Age: 60
End: 2025-02-25
Payer: MEDICARE

## 2025-02-25 VITALS
OXYGEN SATURATION: 100 % | BODY MASS INDEX: 25.25 KG/M2 | DIASTOLIC BLOOD PRESSURE: 70 MMHG | TEMPERATURE: 97 F | WEIGHT: 170.5 LBS | HEIGHT: 69 IN | SYSTOLIC BLOOD PRESSURE: 136 MMHG | HEART RATE: 74 BPM

## 2025-02-25 DIAGNOSIS — C61 PROSTATE CANCER: Primary | ICD-10-CM

## 2025-02-25 DIAGNOSIS — R07.9 CHEST PAIN, UNSPECIFIED TYPE: ICD-10-CM

## 2025-02-25 DIAGNOSIS — Z79.818 ANDROGEN DEPRIVATION THERAPY: ICD-10-CM

## 2025-02-25 PROCEDURE — 99214 OFFICE O/P EST MOD 30 MIN: CPT | Mod: PBBFAC,PN | Performed by: NURSE PRACTITIONER

## 2025-02-25 PROCEDURE — 99999 PR PBB SHADOW E&M-EST. PATIENT-LVL IV: CPT | Mod: PBBFAC,,, | Performed by: NURSE PRACTITIONER

## 2025-02-25 PROCEDURE — 99214 OFFICE O/P EST MOD 30 MIN: CPT | Mod: S$PBB,,, | Performed by: NURSE PRACTITIONER

## 2025-02-25 NOTE — TELEPHONE ENCOUNTER
----- Message from EFRAÍN Singh-STEPHANIE sent at 2/25/2025 12:42 PM CST -----  Regarding: Chest pain  Dr. Daniel, Mr. Martinez reported he is having very quick chest pains and SOB that only lasts for seconds and then is gone.  I see he told Dr. Hills previously, but unsure if he told you. I placed a referral to cardio oncology, but didn't know if you wanted to speak with him or do anything different. Thanks, Radha

## 2025-02-25 NOTE — PROGRESS NOTES
Prostate Cancer Shared Medical Visit  Ronald Martinez  59 y.o. is here to seek an integrative approach to discuss side effects related to prostate cancer diagnosis and treatment.     HPI  Ronald Martinez  was diagnosed with prostate cancer after a biopsy 9/2024. He is on Lupron and abiraterone. He reports since starting the medication he has an occasional chest pain and shortness of breath that lasts a few seconds and then is gone. He has hot flashes, but they are decreasing. He reports fatigue. He denies anxiety or depression. He has a good appetite and follows a healthy diet. He exercises daily, at least 30 minutes a day. He does feel like the medication is working and that his pains are slowly going away. He does feel like his pain moves around including lower back and left arm.  He does have some stomach pains that he attributes to the mets to his lymph nodes.   He is single, lives alone. His brother is a good support system.     Past Medical History  Past Medical History:   Diagnosis Date    Cancer     prostate      Past Surgical History   Past Surgical History:   Procedure Laterality Date    CYST REMOVAL      TRANSRECTAL BIOPSY OF PROSTATE WITH ULTRASOUND GUIDANCE N/A 9/27/2024    Procedure: BIOPSY, PROSTATE, RECTAL APPROACH, WITH US GUIDANCE;  Surgeon: ANDREA Haas MD;  Location: [facility];  Service: Urology;  Laterality: N/A;      Family History   Family History   Problem Relation Name Age of Onset    Atrial fibrillation Mother      Deep vein thrombosis Father      Heart attack Father      Kidney nephrosis Brother        Allergies  Review of patient's allergies indicates:  No Known Allergies   Current Medications:  Current Medications[1]     Review of Systems  Review of Systems   Constitutional:  Positive for malaise/fatigue.        Hot flashes   HENT: Negative.     Eyes: Negative.    Respiratory: Negative.     Cardiovascular: Negative.    Gastrointestinal: Negative.    Genitourinary: Negative.   "  Musculoskeletal:  Positive for back pain and joint pain.   Skin: Negative.    Neurological: Negative.    Endo/Heme/Allergies: Negative.    Psychiatric/Behavioral: Negative.        Physical Exam      Vitals:    02/25/25 0843   BP: 136/70   BP Location: Right arm   Patient Position: Sitting   Pulse: 74   Temp: 97.2 °F (36.2 °C)   TempSrc: Temporal   SpO2: 100%   Weight: 77.3 kg (170 lb 8 oz)   Height: 5' 8.5" (1.74 m)     Body mass index is 25.55 kg/m².  Physical Exam  Vitals reviewed.   Constitutional:       Appearance: Normal appearance.   Neurological:      Mental Status: He is alert.   Psychiatric:         Mood and Affect: Mood normal.         Behavior: Behavior normal.        ASSESSMENT :  1. Prostate cancer    2. Androgen deprivation therapy    3. Chest pain, unspecified type      Ronald Martinez learned about mind-body medicine, plant-focused diets, stress and anxiety reduction techniques, effective coping practices and lifestyle choices to help fight the downstream effects of anxiety, depression, stress, poor sleep, lack of exercise, poor diet and ensure greater well-being through cancer journey.   Today the health topic was: Promoting well-being on ADT  Today our exercise and mind body activity was: Adrián Chi, a moving meditation,  to reduce stress, increase flexibility and muscle strength, improve balance, and promote serenity.   Physical Therapist, Kesha Espinoza, PT, CLT, spoke regarding the benefits of physical therapy in relation to muscle loss and fatigue.   Dr. Diallo, oncology psychology, spoke to the group regarding the benefits of psychology and coping with side effects, anxiety,and depression through and after treatment.   Today the acupuncturist, Dr. Suzan Maynard, provided ear seeing to decrease anxiety and discuss ways acupuncture can help fight side effects of ADT such as hot flashes and fatigue.   Today our nutrition activity focused on plant strong diet to educate and encourage healthy diets to " help decrease the side effects of ADT. Overnight Oats as a snack and the recipe was provided.   Today the following entities were present during the visit: NP, RD, PT, Psychology, Certified Yoga and , Acupuncturist.   Experiential: relaxation practices with the     During the visit today the patient received emotional support from the group, guided support, group discussion as well as personalized instructions from the nurse practitioner and ancillary support on individualized symptoms, risk factors and potential side effects of treatment. Follow-up will be arranged as specified below.    PLAN:  Reviewed all information discussed at today's visit and all questions were answered.    Encouraged to continue exercising  3-5 days a week with the goal of exercising at least 150 minutes per week of moderate intensity aerobic activity or at least 75 minutes of vigorous activity,   Referral to Cardio Oncology, Dr. Nidhi MAC discussed and recommended the following support services:  Adrián Chi and Yoga I suggested Adrián Chi and/or Yoga as these practices reduce stress, increases flexibility and muscle strength, improves balance and promotes serenity in the power of movement to help fight disease and boost your immune system.   Music and relaxation therapy and Meditation which can decrease stress by lowering blood pressure, slowing breathing, and helping you be more present in the moment. It improves sleep by relaxing the body and mind at the end of the day.Meditation also trains you how to focus on one thing at a time, improving concentration. It also promotes emotional well-being by decreasing depression and anxiety, and helping create a more positive outlook on life.  Art Therapy    Follow up with Integrative Services in 2 months at Prostate Cancer Shared Medical Visit Series 2.       [1]   Current Outpatient Medications:     abiraterone (ZYTIGA) 500 mg Tab, Take 2 tablets (1,000 mg total) by mouth once  daily., Disp: 60 tablet, Rfl: 6    calcium-vitamin D3 (OS-GORDON 500 + D3) 500 mg-5 mcg (200 unit) per tablet, Take 1 tablet by mouth 2 (two) times daily with meals., Disp: , Rfl:     predniSONE (DELTASONE) 5 MG tablet, Take 1 tablet (5 mg total) by mouth once daily., Disp: 30 tablet, Rfl: 6    tamsulosin (FLOMAX) 0.4 mg Cap, Take 1 capsule (0.4 mg total) by mouth once daily., Disp: 90 capsule, Rfl: 0

## 2025-02-27 ENCOUNTER — PATIENT MESSAGE (OUTPATIENT)
Dept: HEMATOLOGY/ONCOLOGY | Facility: CLINIC | Age: 60
End: 2025-02-27
Payer: MEDICARE

## 2025-03-05 DIAGNOSIS — C61 PROSTATE CANCER: ICD-10-CM

## 2025-03-05 RX ORDER — TAMSULOSIN HYDROCHLORIDE 0.4 MG/1
0.4 CAPSULE ORAL DAILY
Qty: 90 CAPSULE | Refills: 0 | Status: SHIPPED | OUTPATIENT
Start: 2025-03-05

## 2025-03-11 ENCOUNTER — TELEPHONE (OUTPATIENT)
Dept: CARDIOLOGY | Facility: CLINIC | Age: 60
End: 2025-03-11
Payer: MEDICARE

## 2025-03-11 ENCOUNTER — LAB VISIT (OUTPATIENT)
Dept: LAB | Facility: HOSPITAL | Age: 60
End: 2025-03-11
Attending: INTERNAL MEDICINE
Payer: MEDICARE

## 2025-03-11 DIAGNOSIS — C61 PROSTATE CANCER: ICD-10-CM

## 2025-03-11 LAB
ALBUMIN SERPL BCP-MCNC: 3.6 G/DL (ref 3.5–5.2)
ALP SERPL-CCNC: 66 U/L (ref 40–150)
ALT SERPL W/O P-5'-P-CCNC: 29 U/L (ref 10–44)
ANION GAP SERPL CALC-SCNC: 8 MMOL/L (ref 8–16)
AST SERPL-CCNC: 26 U/L (ref 10–40)
BASOPHILS # BLD AUTO: 0.06 K/UL (ref 0–0.2)
BASOPHILS NFR BLD: 0.8 % (ref 0–1.9)
BILIRUB SERPL-MCNC: 0.3 MG/DL (ref 0.1–1)
BUN SERPL-MCNC: 20 MG/DL (ref 8–23)
CALCIUM SERPL-MCNC: 9.2 MG/DL (ref 8.7–10.5)
CHLORIDE SERPL-SCNC: 106 MMOL/L (ref 95–110)
CO2 SERPL-SCNC: 26 MMOL/L (ref 23–29)
CREAT SERPL-MCNC: 0.8 MG/DL (ref 0.5–1.4)
DIFFERENTIAL METHOD BLD: ABNORMAL
EOSINOPHIL # BLD AUTO: 0.1 K/UL (ref 0–0.5)
EOSINOPHIL NFR BLD: 1.7 % (ref 0–8)
ERYTHROCYTE [DISTWIDTH] IN BLOOD BY AUTOMATED COUNT: 11.9 % (ref 11.5–14.5)
EST. GFR  (NO RACE VARIABLE): >60 ML/MIN/1.73 M^2
GLUCOSE SERPL-MCNC: 99 MG/DL (ref 70–110)
HCT VFR BLD AUTO: 40.7 % (ref 40–54)
HGB BLD-MCNC: 13.8 G/DL (ref 14–18)
IMM GRANULOCYTES # BLD AUTO: 0.02 K/UL (ref 0–0.04)
IMM GRANULOCYTES NFR BLD AUTO: 0.3 % (ref 0–0.5)
LYMPHOCYTES # BLD AUTO: 2 K/UL (ref 1–4.8)
LYMPHOCYTES NFR BLD: 28.3 % (ref 18–48)
MCH RBC QN AUTO: 30.5 PG (ref 27–31)
MCHC RBC AUTO-ENTMCNC: 33.9 G/DL (ref 32–36)
MCV RBC AUTO: 90 FL (ref 82–98)
MONOCYTES # BLD AUTO: 0.8 K/UL (ref 0.3–1)
MONOCYTES NFR BLD: 11.3 % (ref 4–15)
NEUTROPHILS # BLD AUTO: 4.1 K/UL (ref 1.8–7.7)
NEUTROPHILS NFR BLD: 57.6 % (ref 38–73)
NRBC BLD-RTO: 0 /100 WBC
PLATELET # BLD AUTO: 320 K/UL (ref 150–450)
PMV BLD AUTO: 9.3 FL (ref 9.2–12.9)
POTASSIUM SERPL-SCNC: 3.9 MMOL/L (ref 3.5–5.1)
PROT SERPL-MCNC: 6.9 G/DL (ref 6–8.4)
RBC # BLD AUTO: 4.53 M/UL (ref 4.6–6.2)
SODIUM SERPL-SCNC: 140 MMOL/L (ref 136–145)
WBC # BLD AUTO: 7.14 K/UL (ref 3.9–12.7)

## 2025-03-11 PROCEDURE — 85025 COMPLETE CBC W/AUTO DIFF WBC: CPT | Mod: PN | Performed by: INTERNAL MEDICINE

## 2025-03-11 PROCEDURE — 36415 COLL VENOUS BLD VENIPUNCTURE: CPT | Mod: PN | Performed by: INTERNAL MEDICINE

## 2025-03-11 PROCEDURE — 80053 COMPREHEN METABOLIC PANEL: CPT | Mod: PN | Performed by: INTERNAL MEDICINE

## 2025-03-11 NOTE — TELEPHONE ENCOUNTER
Pt. Came into clinic to do blood work and requested to speak to me. Said that HE IS STILL NOT interested in making the Cardiology appt. This was the second attempt of me trying to get him scheduled. I already sent message to referring provider as well. I told him I would document everything in chart. And to call us if need be.

## 2025-04-01 ENCOUNTER — PATIENT MESSAGE (OUTPATIENT)
Dept: HEMATOLOGY/ONCOLOGY | Facility: CLINIC | Age: 60
End: 2025-04-01
Payer: MEDICARE

## 2025-04-08 ENCOUNTER — LAB VISIT (OUTPATIENT)
Dept: LAB | Facility: HOSPITAL | Age: 60
End: 2025-04-08
Attending: INTERNAL MEDICINE
Payer: MEDICARE

## 2025-04-08 DIAGNOSIS — C61 PROSTATE CANCER: ICD-10-CM

## 2025-04-08 LAB
ABSOLUTE EOSINOPHIL (OHS): 0.1 K/UL
ABSOLUTE MONOCYTE (OHS): 0.66 K/UL (ref 0.3–1)
ABSOLUTE NEUTROPHIL COUNT (OHS): 3.67 K/UL (ref 1.8–7.7)
ALBUMIN SERPL BCP-MCNC: 3.9 G/DL (ref 3.5–5.2)
ALP SERPL-CCNC: 45 UNIT/L (ref 40–150)
ALT SERPL W/O P-5'-P-CCNC: 23 UNIT/L (ref 10–44)
ANION GAP (OHS): 9 MMOL/L (ref 8–16)
AST SERPL-CCNC: 22 UNIT/L (ref 11–45)
BASOPHILS # BLD AUTO: 0.05 K/UL
BASOPHILS NFR BLD AUTO: 0.8 %
BILIRUB SERPL-MCNC: 0.3 MG/DL (ref 0.1–1)
BUN SERPL-MCNC: 21 MG/DL (ref 8–23)
CALCIUM SERPL-MCNC: 9.2 MG/DL (ref 8.7–10.5)
CHLORIDE SERPL-SCNC: 106 MMOL/L (ref 95–110)
CO2 SERPL-SCNC: 26 MMOL/L (ref 23–29)
CREAT SERPL-MCNC: 0.8 MG/DL (ref 0.5–1.4)
ERYTHROCYTE [DISTWIDTH] IN BLOOD BY AUTOMATED COUNT: 11.9 % (ref 11.5–14.5)
GFR SERPLBLD CREATININE-BSD FMLA CKD-EPI: >60 ML/MIN/1.73/M2
GLUCOSE SERPL-MCNC: 87 MG/DL (ref 70–110)
HCT VFR BLD AUTO: 40.3 % (ref 40–54)
HGB BLD-MCNC: 13.9 GM/DL (ref 14–18)
IMM GRANULOCYTES # BLD AUTO: 0.01 K/UL (ref 0–0.04)
IMM GRANULOCYTES NFR BLD AUTO: 0.2 % (ref 0–0.5)
LYMPHOCYTES # BLD AUTO: 1.54 K/UL (ref 1–4.8)
MCH RBC QN AUTO: 30.6 PG (ref 27–31)
MCHC RBC AUTO-ENTMCNC: 34.5 G/DL (ref 32–36)
MCV RBC AUTO: 89 FL (ref 82–98)
NUCLEATED RBC (/100WBC) (OHS): 0 /100 WBC
PLATELET # BLD AUTO: 242 K/UL (ref 150–450)
PMV BLD AUTO: 9.6 FL (ref 9.2–12.9)
POTASSIUM SERPL-SCNC: 3.8 MMOL/L (ref 3.5–5.1)
PROT SERPL-MCNC: 6.9 GM/DL (ref 6–8.4)
RBC # BLD AUTO: 4.54 M/UL (ref 4.6–6.2)
RELATIVE EOSINOPHIL (OHS): 1.7 %
RELATIVE LYMPHOCYTE (OHS): 25.5 % (ref 18–48)
RELATIVE MONOCYTE (OHS): 10.9 % (ref 4–15)
RELATIVE NEUTROPHIL (OHS): 60.9 % (ref 38–73)
SODIUM SERPL-SCNC: 141 MMOL/L (ref 136–145)
WBC # BLD AUTO: 6.03 K/UL (ref 3.9–12.7)

## 2025-04-08 PROCEDURE — 85025 COMPLETE CBC W/AUTO DIFF WBC: CPT | Mod: PN

## 2025-04-08 PROCEDURE — 84460 ALANINE AMINO (ALT) (SGPT): CPT | Mod: PN

## 2025-04-08 PROCEDURE — 36415 COLL VENOUS BLD VENIPUNCTURE: CPT | Mod: PN

## 2025-04-15 ENCOUNTER — TELEPHONE (OUTPATIENT)
Dept: HEMATOLOGY/ONCOLOGY | Facility: CLINIC | Age: 60
End: 2025-04-15
Payer: MEDICARE

## 2025-04-15 ENCOUNTER — PATIENT MESSAGE (OUTPATIENT)
Dept: HEMATOLOGY/ONCOLOGY | Facility: CLINIC | Age: 60
End: 2025-04-15
Payer: MEDICARE

## 2025-04-15 NOTE — TELEPHONE ENCOUNTER
Returned call to pt, scheduled pt for our upcoming SMV, sent portal msg confirming. Pt verbalized understanding, confirmed day/time and thanked me for returning call.  ----- Message from Alberta sent at 4/15/2025 10:07 AM CDT -----  Type:  Patient Returning CallWho Called:  Patient Who Left Message for Patient:  Portal message to call Cydney or Sanjay the patient know what this is regarding?:  yesBest Call Back Number: [phone] Additional Information:  Patient responding from portal message

## 2025-04-21 ENCOUNTER — TELEPHONE (OUTPATIENT)
Dept: HEMATOLOGY/ONCOLOGY | Facility: CLINIC | Age: 60
End: 2025-04-21
Payer: MEDICARE

## 2025-04-21 NOTE — TELEPHONE ENCOUNTER
Spoke to patient and rescheduled 5/6 appt to 5/8 at 8am due to Dr Daniel being out of office.patient agreed to date/time

## 2025-04-22 ENCOUNTER — OFFICE VISIT (OUTPATIENT)
Dept: HEMATOLOGY/ONCOLOGY | Facility: CLINIC | Age: 60
End: 2025-04-22
Payer: MEDICARE

## 2025-04-22 VITALS
OXYGEN SATURATION: 96 % | DIASTOLIC BLOOD PRESSURE: 64 MMHG | SYSTOLIC BLOOD PRESSURE: 118 MMHG | TEMPERATURE: 97 F | HEART RATE: 71 BPM

## 2025-04-22 DIAGNOSIS — C61 PROSTATE CANCER: Primary | ICD-10-CM

## 2025-04-22 PROCEDURE — 99214 OFFICE O/P EST MOD 30 MIN: CPT | Mod: S$PBB,,, | Performed by: NURSE PRACTITIONER

## 2025-04-22 PROCEDURE — 99999 PR PBB SHADOW E&M-EST. PATIENT-LVL III: CPT | Mod: PBBFAC,,, | Performed by: NURSE PRACTITIONER

## 2025-04-22 PROCEDURE — 99213 OFFICE O/P EST LOW 20 MIN: CPT | Mod: PBBFAC,PN | Performed by: NURSE PRACTITIONER

## 2025-04-22 NOTE — PROGRESS NOTES
Prostate Cancer Shared Medical Visit  Ronald Martinez  60 y.o. is here to seek an integrative approach to discuss side effects related to prostate cancer diagnosis and treatment.     HPI  Ronald Martinez reports he initially had incontinence but does not anymore. He is not sexually active and reports and is unsure if he could be if he wanted to. He is waking up at least 3 times through the night to urinate. He is sleeping fair. He has increased his exercise and is doing 30-45 minutes of low impact aerobics in addition to 10 minutes of bike riding. He has a good appetite and is eating healthy.     Past Medical History  Past Medical History:   Diagnosis Date    Cancer     prostate      Past Surgical History   Past Surgical History:   Procedure Laterality Date    CYST REMOVAL      TRANSRECTAL BIOPSY OF PROSTATE WITH ULTRASOUND GUIDANCE N/A 9/27/2024    Procedure: BIOPSY, PROSTATE, RECTAL APPROACH, WITH US GUIDANCE;  Surgeon: ANDREA Haas MD;  Location: [facility];  Service: Urology;  Laterality: N/A;      Family History   Family History   Problem Relation Name Age of Onset    Atrial fibrillation Mother      Deep vein thrombosis Father      Heart attack Father      Kidney nephrosis Brother        Allergies  Review of patient's allergies indicates:  No Known Allergies   Current Medications:  Current Medications[1]     Review of Systems  Review of Systems   Constitutional:  Positive for malaise/fatigue.   HENT: Negative.     Eyes: Negative.    Respiratory: Negative.     Cardiovascular: Negative.    Gastrointestinal: Negative.    Genitourinary: Negative.    Musculoskeletal:  Positive for back pain and joint pain.   Skin: Negative.    Neurological: Negative.    Endo/Heme/Allergies: Negative.    Psychiatric/Behavioral:  Negative for depression. The patient is not nervous/anxious and does not have insomnia.       Physical Exam      Vitals:    04/22/25 0835   BP: 118/64   Patient Position: Sitting   Pulse: 71   Temp: 97.3 °F  (36.3 °C)   TempSrc: Temporal   SpO2: 96%       Physical Exam  Vitals reviewed.   Constitutional:       Appearance: Normal appearance.   Neurological:      Mental Status: He is alert.   Psychiatric:         Mood and Affect: Mood normal.         Behavior: Behavior normal.           ASSESSMENT :  No diagnosis found.     Today the health topic was: Sexual Health and Wellness   Mr. Martinez learned about mind-body medicine including Adrián Chi, stress and anxiety reduction techniques, effective coping practices and lifestyle choices to help fight the downstream effects of anxiety, depression, stress, poor sleep, lack of exercise, poor diet and ensure greater well-being through cancer journey.   Today our exercise and mind body activity was: Adrián Chi, a moving meditation,  to reduce stress, increase flexibility and muscle strength, improve balance, and promote serenity.   Dr. Perez, urologist, spoke to the group on sexual health and wellness  Alisson Ortiz, PT, ADEN, pelvic floor PT, discussed the role PT can play in following prostate treatment to gain optimal function of pelvic floor muscles.   Dr. Diallo oncology psychology, spoke to the group regarding sexual health and the effects sexual side effects can have including anxiety, depression, and shame.    Today our registered dietitian provided energy bites and the recipe.    Today the following entities were present during the visit: NP, RD, PT, Psychology, Certified Yoga and ,     During the visit today the patient received emotional support from the group, guided support, group discussion as well as personalized instructions from the nurse practitioner and ancillary support on individualized symptoms, risk factors and potential side effects of treatment. Follow-up will be arranged as specified below.    PLAN:  Reviewed all information discussed at today's visit and all questions were answered. Continue exercising at least 150 minutes per week of  moderate intensity aerobic activity or at least 75 minutes of vigorous activity,   I discussed and recommended the following support services:  Adrián Chi and Yoga I suggested Adrián Chi and/or Yoga as these practices reduce stress, increases flexibility and muscle strength, improves balance and promotes serenity in the power of movement to help fight disease and boost your immune system.   Music and relaxation therapy and Meditation which can decrease stress by lowering blood pressure, slowing breathing, and helping you be more present in the moment. It improves sleep by relaxing the body and mind at the end of the day.Meditation also trains you how to focus on one thing at a time, improving concentration. It also promotes emotional well-being by decreasing depression and anxiety, and helping create a more positive outlook on life.  Art Therapy    Mr. Martinez will consider pelvic floor PT and notify me if he would like a referral.     Follow up with [facility] as needed          [1]   Current Outpatient Medications:     abiraterone (ZYTIGA) 500 mg Tab, Take 2 tablets (1,000 mg total) by mouth once daily., Disp: 60 tablet, Rfl: 6    calcium-vitamin D3 (OS-GORDON 500 + D3) 500 mg-5 mcg (200 unit) per tablet, Take 1 tablet by mouth 2 (two) times daily with meals., Disp: , Rfl:     predniSONE (DELTASONE) 5 MG tablet, Take 1 tablet (5 mg total) by mouth once daily., Disp: 30 tablet, Rfl: 6    tamsulosin (FLOMAX) 0.4 mg Cap, Take 1 capsule (0.4 mg total) by mouth once daily., Disp: 90 capsule, Rfl: 0

## 2025-05-02 ENCOUNTER — HOSPITAL ENCOUNTER (OUTPATIENT)
Dept: RADIOLOGY | Facility: HOSPITAL | Age: 60
Discharge: HOME | End: 2025-05-02
Attending: INTERNAL MEDICINE
Payer: MEDICARE

## 2025-05-02 DIAGNOSIS — C61 PROSTATE CANCER: ICD-10-CM

## 2025-05-02 PROCEDURE — 78815 PET IMAGE W/CT SKULL-THIGH: CPT | Mod: TC,PN

## 2025-05-02 PROCEDURE — 78815 PET IMAGE W/CT SKULL-THIGH: CPT | Mod: 26,PS,, | Performed by: RADIOLOGY

## 2025-05-02 PROCEDURE — A9595 HC PIFLUFOLASTAT F-18, DX, PER 1 MCI: HCPCS | Mod: TB,PN | Performed by: INTERNAL MEDICINE

## 2025-05-02 RX ADMIN — PIFLUFOLASTAT F-18 10.2 MILLICURIE: 80 INJECTION INTRAVENOUS at 10:05

## 2025-05-02 NOTE — PROGRESS NOTES
PET Imaging Questionnaire    Are you a Diabetic? Recent Blood Sugar level? No    Are you anemic? Bone Marrow Stimulation Meds? No    Have you had a CT Scan, if so when & where was your last one? Yes -     Have you had a PET Scan, if so when & where was your last one? Yes -     Chemotherapy or currently on Chemotherapy? No    Radiation therapy? No    Surgical History:   Past Surgical History:   Procedure Laterality Date    CYST REMOVAL      TRANSRECTAL BIOPSY OF PROSTATE WITH ULTRASOUND GUIDANCE N/A 9/27/2024    Procedure: BIOPSY, PROSTATE, RECTAL APPROACH, WITH US GUIDANCE;  Surgeon: ANDREA Haas MD;  Location: [facility];  Service: Urology;  Laterality: N/A;        Have you been fasting for at least 6 hours? No    Is there any chance you may be pregnant or breastfeeding? No    Assay: 11.7 MCi@:1018   Injection Site:rt arm    Residual: 1.48 mCi@: 1028   Technologist: Gina Schmid Injected:10.2mCi

## 2025-05-07 DIAGNOSIS — C61 PROSTATE CANCER: ICD-10-CM

## 2025-05-07 RX ORDER — PREDNISONE 5 MG/1
5 TABLET ORAL DAILY
Qty: 30 TABLET | Refills: 6 | Status: ACTIVE | OUTPATIENT
Start: 2025-05-07

## 2025-05-07 RX ORDER — ABIRATERONE 500 MG/1
1000 TABLET ORAL DAILY
Qty: 60 TABLET | Refills: 6 | Status: ACTIVE | OUTPATIENT
Start: 2025-05-07

## 2025-05-08 ENCOUNTER — OFFICE VISIT (OUTPATIENT)
Dept: HEMATOLOGY/ONCOLOGY | Facility: CLINIC | Age: 60
End: 2025-05-08
Payer: MEDICARE

## 2025-05-08 ENCOUNTER — TELEPHONE (OUTPATIENT)
Dept: RADIATION ONCOLOGY | Facility: CLINIC | Age: 60
End: 2025-05-08
Payer: MEDICARE

## 2025-05-08 ENCOUNTER — INFUSION (OUTPATIENT)
Dept: INFUSION THERAPY | Facility: HOSPITAL | Age: 60
End: 2025-05-08
Attending: INTERNAL MEDICINE
Payer: MEDICARE

## 2025-05-08 VITALS
TEMPERATURE: 98 F | HEART RATE: 68 BPM | WEIGHT: 166.88 LBS | DIASTOLIC BLOOD PRESSURE: 76 MMHG | RESPIRATION RATE: 17 BRPM | SYSTOLIC BLOOD PRESSURE: 128 MMHG | BODY MASS INDEX: 24.72 KG/M2 | OXYGEN SATURATION: 97 % | HEIGHT: 69 IN

## 2025-05-08 VITALS
SYSTOLIC BLOOD PRESSURE: 128 MMHG | TEMPERATURE: 98 F | RESPIRATION RATE: 17 BRPM | BODY MASS INDEX: 24.72 KG/M2 | WEIGHT: 166.88 LBS | HEIGHT: 69 IN | DIASTOLIC BLOOD PRESSURE: 76 MMHG | OXYGEN SATURATION: 97 % | HEART RATE: 68 BPM

## 2025-05-08 DIAGNOSIS — C61 PROSTATE CANCER: Primary | ICD-10-CM

## 2025-05-08 DIAGNOSIS — C79.51 METASTASIS TO BONE: ICD-10-CM

## 2025-05-08 DIAGNOSIS — D64.9 NORMOCYTIC ANEMIA: ICD-10-CM

## 2025-05-08 PROCEDURE — 99215 OFFICE O/P EST HI 40 MIN: CPT | Mod: S$PBB,,, | Performed by: INTERNAL MEDICINE

## 2025-05-08 PROCEDURE — G2211 COMPLEX E/M VISIT ADD ON: HCPCS | Mod: S$PBB,,, | Performed by: INTERNAL MEDICINE

## 2025-05-08 PROCEDURE — 63600175 PHARM REV CODE 636 W HCPCS: Mod: JZ,TB,PN | Performed by: INTERNAL MEDICINE

## 2025-05-08 PROCEDURE — 96372 THER/PROPH/DIAG INJ SC/IM: CPT | Mod: PN,59

## 2025-05-08 PROCEDURE — 96402 CHEMO HORMON ANTINEOPL SQ/IM: CPT | Mod: PN

## 2025-05-08 PROCEDURE — 99999 PR PBB SHADOW E&M-EST. PATIENT-LVL III: CPT | Mod: PBBFAC,,, | Performed by: INTERNAL MEDICINE

## 2025-05-08 PROCEDURE — 99213 OFFICE O/P EST LOW 20 MIN: CPT | Mod: PBBFAC,25,PN | Performed by: INTERNAL MEDICINE

## 2025-05-08 RX ORDER — CYANOCOBALAMIN (VITAMIN B-12) 500 MCG
TABLET ORAL 2 TIMES DAILY
COMMUNITY

## 2025-05-08 RX ORDER — CALCIUM CARBONATE 500(1250)
1 TABLET ORAL 2 TIMES DAILY
COMMUNITY

## 2025-05-08 RX ORDER — ACETAMINOPHEN 500 MG
500 TABLET ORAL 2 TIMES DAILY
COMMUNITY

## 2025-05-08 RX ADMIN — DENOSUMAB 120 MG: 120 INJECTION SUBCUTANEOUS at 09:05

## 2025-05-08 RX ADMIN — LEUPROLIDE ACETATE 22.5 MG: KIT at 09:05

## 2025-05-08 NOTE — PROGRESS NOTES
PATIENT: Ronald Martinez  MRN: [id number]  DATE: 5/8/2025      Diagnosis:   1. Prostate cancer    2. Metastasis to bone    3. Normocytic anemia                Chief Complaint: Follow-up (Prostate cancer)      Oncologic History:      Oncologic History     Oncologic Treatment     Pathology           Subjective:    Interval History: Mr. Martinez is a 60 y.o. male presets for metastatic prostate cancer.  Since the last clinic visit the patient underwent PSMA PET/CT on 5/02/25 showing decrease in hypermetabolic activity in the prostate, resolution of previous pelvic sidewall lymphadenopathy; decrease in degree of hypermetabolic activity in sclerotic lesion of the right pubic symphysis and improvement in lesion inferior pubic ramus in the right.  The patient denies CP, cough, SOB, abdominal pain, nausea, vomiting, constipation, diarrhea.  The patient denies fever, chills, night sweats, weight loss, new lumps or bumps, easy bruising or bleeding.    Prior History:  PSA on 10/20/2023 was 64.9 ng/mL.  The patient underwent MRI prostate 09/06/2024 showing a large lesion involving the majority of the prostate gland.  The patient underwent prostate biopsy on 09/27/2024 showing prostatic adenocarcinoma [place] score 4+5=9 grade group 5.  Patient underwent PSMA PET-CT on 10/04/2024 showing markedly enlarged prostate gland with diffuse radiotracer uptake; metastatic right pelvic lymph node measuring 2.8 x 1.4 cm; left internal iliac node measuring 1.9 x 1.2 cm; left external iliac lymph node measuring 2.5 x 1.5 cm; infiltrative lesion in the right inferior pubic ramus extending into the pubic symphysis; nodular focus of radiotracer accumulation in the left inferior pubic ramus; punctate focus of radiotracer accumulation in the right iliac bone; nodular focus of radiotracer accumulation within the left lamina of S1.    PSA on 10/18/24 was 213ng/mL.  Inv[facility]ae returned negative for germline mutations tested.  TEMPUS blood was negative.   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   PSA was 3ng/mL on 1/06/25.    Past Medical History:   Past Medical History:   Diagnosis Date    Cancer     prostate       Past Surgical HIstory:   Past Surgical History:   Procedure Laterality Date    CYST REMOVAL      TRANSRECTAL BIOPSY OF PROSTATE WITH ULTRASOUND GUIDANCE N/A 9/27/2024    Procedure: BIOPSY, PROSTATE, RECTAL APPROACH, WITH US GUIDANCE;  Surgeon: ANDREA Haas MD;  Location: [facility];  Service: Urology;  Laterality: N/A;       Family History:   Family History   Problem Relation Name Age of Onset    Atrial fibrillation Mother      Deep vein thrombosis Father      Heart attack Father      Kidney nephrosis Brother         Social History:  reports that he quit smoking about 50 years ago. His smoking use included cigarettes. He started smoking about 44 years ago. He has never used smokeless tobacco. He reports that he does not currently use alcohol after a past usage of about 7.0 standard drinks of alcohol per week. He reports that he does not currently use drugs.    Allergies:  Review of patient's allergies indicates:  No Known Allergies    Medications:  Current Outpatient Medications   Medication Sig Dispense Refill    abiraterone (ZYTIGA) 500 mg Tab Take 2 tablets (1,000 mg total) by mouth once daily. 60 tablet 6    predniSONE (DELTASONE) 5 MG tablet Take 1 tablet (5 mg total) by mouth once daily. 30 tablet 6    tamsulosin (FLOMAX) 0.4 mg Cap Take 1 capsule (0.4 mg total) by mouth once daily. 90 capsule 0    acetaminophen (TYLENOL) 500 MG tablet Take 500 mg by mouth 2 (two) times a day.      calcium carbonate (OS-GORDON) 500 mg calcium (1,250 mg) tablet Take 1 tablet by mouth 2 (two) times daily.      cholecalciferol, vitamin D3, (VITAMIN D3) 10 mcg (400 unit) Tab Take by mouth 2 (two) times a day.       No current facility-administered medications for this visit.       Review of Systems   Constitutional:  Negative for chills,  "diaphoresis, fatigue, fever and unexpected weight change.   Respiratory:  Negative for cough and shortness of breath.    Cardiovascular:  Negative for chest pain and palpitations.   Gastrointestinal:  Negative for abdominal pain, constipation, diarrhea, nausea and vomiting.   Skin:  Negative for color change and rash.   Neurological:  Negative for headaches.   Hematological:  Negative for adenopathy. Does not bruise/bleed easily.       ECOG Performance Status: 1   Objective:      Vitals:   Vitals:    05/08/25 0812   BP: 128/76   BP Location: Left arm   Patient Position: Sitting   Pulse: 68   Resp: 17   Temp: 98.2 °F (36.8 °C)   TempSrc: Temporal   SpO2: 97%   Weight: 75.7 kg (166 lb 14.2 oz)   Height: 5' 8.5" (1.74 m)               Physical Exam  Constitutional:       General: He is not in acute distress.     Appearance: He is well-developed. He is not diaphoretic.   HENT:      Head: Normocephalic and atraumatic.   Cardiovascular:      Rate and Rhythm: Normal rate and regular rhythm.      Heart sounds: Normal heart sounds. No murmur heard.     No friction rub. No gallop.   Pulmonary:      Effort: Pulmonary effort is normal. No respiratory distress.      Breath sounds: Normal breath sounds. No wheezing or rales.   Chest:      Chest wall: No tenderness.   Abdominal:      General: Bowel sounds are normal. There is no distension.      Palpations: Abdomen is soft. There is no mass.      Tenderness: There is no abdominal tenderness. There is no rebound.   Musculoskeletal:      Cervical back: Normal range of motion.   Lymphadenopathy:      Cervical: No cervical adenopathy.      Upper Body:      Right upper body: No supraclavicular or axillary adenopathy.      Left upper body: No supraclavicular or axillary adenopathy.   Skin:     General: Skin is warm and dry.      Findings: No erythema or rash.   Neurological:      Mental Status: He is alert and oriented to person, place, and time.   Psychiatric:         Behavior: " Behavior normal.         Laboratory Data:  Lab Visit on 05/02/2025   Component Date Value Ref Range Status    Sodium 05/02/2025 140  136 - 145 mmol/L Final    Potassium 05/02/2025 3.7  3.5 - 5.1 mmol/L Final    Chloride 05/02/2025 106  95 - 110 mmol/L Final    CO2 05/02/2025 26  23 - 29 mmol/L Final    Glucose 05/02/2025 99  70 - 110 mg/dL Final    BUN 05/02/2025 17  8 - 23 mg/dL Final    Creatinine 05/02/2025 0.8  0.5 - 1.4 mg/dL Final    Calcium 05/02/2025 9.3  8.7 - 10.5 mg/dL Final    Protein Total 05/02/2025 6.8  6.0 - 8.4 gm/dL Final    Albumin 05/02/2025 4.0  3.5 - 5.2 g/dL Final    Bilirubin Total 05/02/2025 0.4  0.1 - 1.0 mg/dL Final    For infants and newborns, interpretation of results should be based   on gestational age, weight and in agreement with clinical   observations.    Premature Infant recommended reference ranges:   0-24 hours:  <8.0 mg/dL   24-48 hours: <12.0 mg/dL   3-5 days:    <15.0 mg/dL   6-29 days:   <15.0 mg/dL    ALP 05/02/2025 43  40 - 150 unit/L Final    AST 05/02/2025 22  11 - 45 unit/L Final    ALT 05/02/2025 25  10 - 44 unit/L Final    Anion Gap 05/02/2025 8  8 - 16 mmol/L Final    eGFR 05/02/2025 >60  >60 mL/min/1.73/m2 Final    Estimated GFR calculated using the CKD-EPI creatinine (2021) equation.    Prostate Specific Antigen 05/02/2025 0.39  <=4.00 ng/mL Final    PSA Expected levels:  Hormonal therapy: < 0.05 ng/mL   Prostatectomy: < 0.01 ng/mL   Radiation therapy: < 1.00 ng/mL      WBC 05/02/2025 5.40  3.90 - 12.70 K/uL Final    RBC 05/02/2025 4.40 (L)  4.60 - 6.20 M/uL Final    HGB 05/02/2025 13.4 (L)  14.0 - 18.0 gm/dL Final    HCT 05/02/2025 39.4 (L)  40.0 - 54.0 % Final    MCV 05/02/2025 90  82 - 98 fL Final    MCH 05/02/2025 30.5  27.0 - 31.0 pg Final    MCHC 05/02/2025 34.0  32.0 - 36.0 g/dL Final    RDW 05/02/2025 11.9  11.5 - 14.5 % Final    Platelet Count 05/02/2025 222  150 - 450 K/uL Final    MPV 05/02/2025 9.6  9.2 - 12.9 fL Final    Nucleated RBC 05/02/2025 0  <=0  /100 WBC Final    Neut % 05/02/2025 54.7  38 - 73 % Final    Lymph % 05/02/2025 31.1  18 - 48 % Final    Mono % 05/02/2025 10.9  4 - 15 % Final    Eos % 05/02/2025 2.0  <=8 % Final    Basophil % 05/02/2025 1.1  <=1.9 % Final    Imm Grans % 05/02/2025 0.2  0.0 - 0.5 % Final    Neut # 05/02/2025 2.95  1.8 - 7.7 K/uL Final    Lymph # 05/02/2025 1.68  1 - 4.8 K/uL Final    Mono # 05/02/2025 0.59  0.3 - 1 K/uL Final    Eos # 05/02/2025 0.11  <=0.5 K/uL Final    Baso # 05/02/2025 0.06  <=0.2 K/uL Final    Imm Grans # 05/02/2025 0.01  0.00 - 0.04 K/uL Final    Mild elevation in immature granulocytes is non specific and can be seen in a variety of conditions including stress response, acute inflammation, trauma and pregnancy. Correlation with other laboratory and clinical findings is essential.         Imaging:     PSMA PET/CT 5/02/25  Head and Neck:     Brain demonstrates normal metabolism. Physiologic uptake is in the neck.     Chest:     No PET avid pulmonary lesions are present. No enlarged or PET avid lymph nodes are in the chest.     Abdomen and Pelvis:     Physiologic uptake is in the liver, spleen, urinary system and bowel. No enlarged or PET avid lymph nodes are in the abdomen or pelvis. Adrenal glands are normal. Within the left aspect of the prostate gland a hypermetabolic lesions identified with maximum SUV of 16.0 on image number 287. This is significantly decreased compared to the prior examination. Previous scribe pelvic sidewall lymphadenopathy is significant resolved with a residual right-sided lymph node on image number 282 with maximum SUV of 9.7. This measures 7 mm in short dimension. Ureteral activity is identified within the retroperitoneum more prominent on the right than left.     Musculoskeletal:     Known sclerotic lesion of the right pubic symphysis demonstrates interval sclerosis and decreased degree of hypermetabolic activity. Residual FDG avidity is identified with maximum SUV of 5.5. Previous  scribe hypermetabolic activity of the inferior pubic ramus on the right is also significantly improved. No new suspicious lesions identified..       Assessment:       1. Prostate cancer    2. Metastasis to bone    3. Normocytic anemia                   Plan:     Prostate Cancer - the patient with metastatic castrate sensitive prostate cancer with lesions in pelvic lymph nodes, right inferior veer pubic ramus, left inferior pubic ramus, right iliac bone, and lamina of S1  -The patient does not have high volume disease outside of the pelvis in his a candidate for abiraterone and prednisone along with Lupron  -Invitae returned negative for germline mutations tested  -TEMPUS blood was negative.  TEMPUS tissue testing showed SPOP, HDAC2, and LRP1B mutations  -Lupron started 11/11/24  -PSA down to 0.39ng/mL on 5/02/25  -PSMA PET/CT 5/02/25 shows improvement in disease  -Pt to return to see Dr Hills for consideration of radiation  Visit today included increased complexity associated with the care of the episodic problem (ADT and Abiratoerone/prednisone) addressed and managing the longitudinal care of the patient due to the serious and/or complex managed problem(s) prostate cancer.    Bone Metastases - Pt candidate for Xgeva  -Pt on Calcium 1,000mg daily and Vitamin D 800IU daily  -Pt cleared by dentist for Xgeva    Anemia - Likely due to ADT and chronic disease  Lab Results   Component Value Date    HGB 13.4 (L) 05/02/2025   -Stable  -will monitor    Route Chart for Scheduling    Med Onc Chart Routing      Follow up with physician 3 months. Pt can proceed with treatment.  Pt needs an appt with Dr Hills ASAP to discuss potential role of radiation.  Pt needs a CBC adn CMP in 4 weeks and in 8 weeks.  Pt needs a CBC, CMP and PSA in 3 months with an appt with me the day after labs with lupron and xgeva.   Follow up with [facility]    Infusion scheduling note    Injection scheduling note    Labs    Imaging    Pharmacy appointment     Other referrals              Supportive Plan Information  OP PROSTATE LEUPROLIDE Q3MO Jose Alberto Daniel MD   Associated Diagnosis: Prostate cancer Stage IVB cTX, cN1, pM1b, Grade Group: 5 noted on 10/18/2024   Line of treatment: Supportive Care   Treatment goal: Palliative     Upcoming Treatment Dates - OP PROSTATE LEUPROLIDE Q3MO    7/14/2025       Chemotherapy       leuprolide (LUPRON) injection 22.5 mg  10/6/2025       Chemotherapy       leuprolide (LUPRON) injection 22.5 mg  12/29/2025       Chemotherapy       leuprolide (LUPRON) injection 22.5 mg  3/23/2026       Chemotherapy       leuprolide (LUPRON) injection 22.5 mg      Jose Alberto Daniel MD  [facility]  Hematology and Oncology  [facility] [address] [place] [address]   O: [phone]  F: [phone]

## 2025-05-08 NOTE — PLAN OF CARE
Problem: Adult Inpatient Plan of Care  Goal: Plan of Care Review  Outcome: Progressing  Flowsheets (Taken 5/8/2025 0930)  Plan of Care Reviewed With: patient  Goal: Patient-Specific Goal (Individualized)  Outcome: Progressing  Flowsheets (Taken 5/8/2025 0930)  Individualized Care Needs: education, conversation,  Anxieties, Fears or Concerns: none voiced  Goal: Absence of Hospital-Acquired Illness or Injury  Outcome: Progressing  Goal: Optimal Comfort and Wellbeing  Outcome: Progressing  Intervention: Provide Person-Centered Care  Flowsheets (Taken 5/8/2025 0930)  Trust Relationship/Rapport:   empathic listening provided   care explained   reassurance provided   questions encouraged   thoughts/feelings acknowledged   questions answered   emotional support provided   choices provided  Goal: Readiness for Transition of Care  Outcome: Progressing     Problem: Fatigue  Goal: Improved Activity Tolerance  Outcome: Progressing  Intervention: Promote Improved Energy  Flowsheets (Taken 5/8/2025 0930)  Fatigue Management:   fatigue-related activity identified   frequent rest breaks encouraged   paced activity encouraged  Sleep/Rest Enhancement:   noise level reduced   relaxation techniques promoted   therapeutic touch utilized   family presence promoted   natural light exposure provided  Activity Management:   Ambulated -L4   Up in chair - L3  Environmental Support:   distractions minimized   calm environment promoted   rest periods encouraged   personal routine supported  Pt arrived to clinic for Lupron and Xgeva injections and tolerated well with no changes throughout therapy. Pt educated to continue oral Calcium and Vitamin D supplements while receiving Xgeva and to avoid invasive dental procedures 3 months before and after Xgeva. Pt encouraged to drink plenty of fluids and aware of side effects. Pt discharged to home in NAD and aware of f/u appts.

## 2025-05-09 ENCOUNTER — DOCUMENTATION ONLY (OUTPATIENT)
Dept: HEMATOLOGY/ONCOLOGY | Facility: CLINIC | Age: 60
End: 2025-05-09
Payer: MEDICARE

## 2025-05-09 NOTE — NURSING
Chart reviewed. PSA is down to 0.39ng/ml. PET scan completed 5/2 shows good treatment response.     Mr. Cardenas would like to f/u with Dr. Hills to discuss palliative radiation. This apt is scheduled 5/15/25.     MARYCRUZ Shanks LPN messaged to consider IO referral. No further navigation needs identified today.

## 2025-05-14 NOTE — PROGRESS NOTES
[facility] Department of Radiation Oncology  Follow Up Visit Note    Diagnosis:  Ronald Martinez is a 60 y.o. male with a(n) Grade Group 5, , metastatic (borderline high-volume) very high risk adenocarcinoma of the prostate.  He presents today to discuss possible palliative treatment with radiation therapy.          Oncologic History:  Oncology History   Prostate cancer   10/18/2024 Initial Diagnosis    Prostate cancer     10/20/2024 Cancer Staged    Staging form: Prostate, AJCC 8th Edition  - Clinical: Stage IVB (cTX, cN1, pM1b, Grade Group: 5)          Interval History  The patient presents today for CT simulation for radiation treatment planning.  He was last seen in our clinic on 10/18/24 .   Since that time, he initiated ADT with Dr. Daniel on 11/11/24 (Lupron/Aleisha/Pred).  States pain had improved significantly, but increased again over the last couple weeks, somewhat controlled on Tylenol ES and aspirin.     5/2/25 PSMA/PET:  No new lesions  Significant improvement or resolution of previous avid lesions  Some residual activity in the prostate, pelvic sidewall adenopathy, R inf pubic ramus     Prostate Specific Antigen PSA Diagnostic   Latest Ref Rng <=4.00 ng/mL 0.00 - 4.00 ng/mL   10/20/2023 64.9 (H)     10/18/2024  213.0 (H)    12/6/2024  9.9 (H)    1/6/2025  3.0    5/2/2025 0.39        Legend:  (H) High    HPI: The patient is a 59 year old male with a diagnosis of prostate cancer.  He was noted to have an elevated PSA of 64.9 on 10/20/23.  Previous PSA history as follows:  Not available    9/6/24 MRI prostate noted:  Large PI-RADS 5 lesion with extracapsular extension, SVI, suspected invasion through the bladder wall extending into the lumen  Multiple enlarged pelvic lymph nodes  Multiple osseous lesions suspicious for metastasis    On 9/27/24 he underwent TRUS-guided biopsy of the prostate, with pathology as follows:  Group 4 or Group 5 disease in all biopsy sites, , 100% of  "cores    10/4/24 PSMA/PET:  Multiple bilateral enlarged pelvic nodes  Multiple osseous metastases involving pelvic bones (right inf pubic ramus, left inf pubic ramus, left lamina S1, posterior right iliac bone      Prostate size estimated at 57cc.    He presents today to discuss the potential role of radiation therapy in his cancer care.    IPSS is 29, with +++++frequency, +++++urgency, and nocturia x 3.    He reports not currently sexually active.     History of prior irradiation: No  History of prior systemic anti-cancer therapy: No  History of collagen vascular disease: No  Implanted electronic device (pacer/defib/nerve stimulator): No    Review of Systems   Review of Systems   Constitutional:  Positive for malaise/fatigue. Negative for chills and fever.   Gastrointestinal:  Positive for abdominal pain.   Genitourinary:  Positive for dysuria and frequency (mild). Negative for hematuria and urgency.   Musculoskeletal:  Positive for joint pain and myalgias. Negative for falls.   Neurological:  Positive for weakness (generalized).   Psychiatric/Behavioral:  The patient is nervous/anxious.        Social History:  Social History[1]    Family History:  Cancer-related family history is not on file.    Exam:  Vitals:    05/15/25 0924   BP: (!) 147/80   Pulse: 64   Resp: 20   Temp: 98.1 °F (36.7 °C)   SpO2: 97%   Weight: 74.4 kg (164 lb 0.4 oz)   Height: 5' 8.5" (1.74 m)     Constitutional: Pleasant 60 y.o. male in no acute distress.  Well nourished. Well groomed.   HEENT: Normocephalic and atraumatic; ill-appearing  Lungs: No audible wheezing.  Normal effort.   Musculoskeletal: No gross MSK deformities. Ambulates  Skin: No rashes appreciated.   Psych: Alert and oriented with appropriate mood and affect.  Neuro:   Grossly normal.      Assessment:  60 y.o. male with a(n) Grade Group 5, , metastatic (borderline high-volume) very high risk adenocarcinoma of the prostate.  He presents today to discuss possible " palliative treatment with radiation therapy.   ECOG: (1) Restricted in physically strenuous activity, ambulatory and able to do work of light nature    Plan:  Indication, course, and potential toxicities of pelvic radiation therapy reviewed in detail, including but not limited to fatigue, increased frequency, urgency, or pain with urination, increased frequency or urgency of bowel movements, diarrhea, pelvic bone fragility, erectile dysfunction, decreased volume of ejaculate, remote risk of secondary malignancy.   He understands the goal of prostate-directed radiation therapy would be to decrease risk of future metastases.  He has fairly high volume disease, which makes metastasis-directed therapy less likely to be of benefit  Pt will consider his options and contact us with his decision  Follow up with [facility] as directed  He was given our contact information, and he was told that he could call our clinic at anytime if he has any questions or concerns.  Follow up with other providers as directed             [1]   Social History  Tobacco Use    Smoking status: Former     Types: Cigarettes     Start date:      Quit date:      Years since quittin.4    Smokeless tobacco: Never   Substance Use Topics    Alcohol use: Not Currently     Alcohol/week: 7.0 standard drinks of alcohol     Types: 7 Glasses of wine per week    Drug use: Not Currently     Comment:  last used

## 2025-05-15 ENCOUNTER — PATIENT MESSAGE (OUTPATIENT)
Dept: RADIATION ONCOLOGY | Facility: CLINIC | Age: 60
End: 2025-05-15

## 2025-05-15 ENCOUNTER — OFFICE VISIT (OUTPATIENT)
Dept: RADIATION ONCOLOGY | Facility: CLINIC | Age: 60
End: 2025-05-15
Payer: MEDICARE

## 2025-05-15 VITALS
OXYGEN SATURATION: 97 % | SYSTOLIC BLOOD PRESSURE: 147 MMHG | HEIGHT: 69 IN | TEMPERATURE: 98 F | BODY MASS INDEX: 24.29 KG/M2 | RESPIRATION RATE: 20 BRPM | HEART RATE: 64 BPM | WEIGHT: 164 LBS | DIASTOLIC BLOOD PRESSURE: 80 MMHG

## 2025-05-15 DIAGNOSIS — C61 PROSTATE CANCER: Primary | ICD-10-CM

## 2025-05-15 PROCEDURE — 99213 OFFICE O/P EST LOW 20 MIN: CPT | Mod: PBBFAC,PN | Performed by: RADIOLOGY

## 2025-05-15 PROCEDURE — 99999 PR PBB SHADOW E&M-EST. PATIENT-LVL III: CPT | Mod: PBBFAC,,, | Performed by: RADIOLOGY

## 2025-05-15 RX ORDER — ASPIRIN 81 MG/1
81 TABLET ORAL DAILY
COMMUNITY

## 2025-05-21 ENCOUNTER — TELEPHONE (OUTPATIENT)
Dept: RADIATION ONCOLOGY | Facility: OTHER | Age: 60
End: 2025-05-21
Payer: MEDICARE

## 2025-05-21 NOTE — TELEPHONE ENCOUNTER
Called in response to patient chart msg. States some of his pains have returned- mainly in bilat stomach and low back. Explained that there are no metastatic lesions that would account for these pains. Reports LBP has been present on and off since prior to Anita. Pt advised to follow up with primary care physician. Pt reports he does now wish to proceed with prostate-directed radiation therapy at this time, but may consider it in future. All questions answered to his apparent satisfaction.

## 2025-06-02 DIAGNOSIS — C61 PROSTATE CANCER: ICD-10-CM

## 2025-06-02 RX ORDER — TAMSULOSIN HYDROCHLORIDE 0.4 MG/1
0.4 CAPSULE ORAL DAILY
Qty: 90 CAPSULE | Refills: 0 | Status: SHIPPED | OUTPATIENT
Start: 2025-06-02

## 2025-06-03 ENCOUNTER — PATIENT MESSAGE (OUTPATIENT)
Dept: HEMATOLOGY/ONCOLOGY | Facility: CLINIC | Age: 60
End: 2025-06-03
Payer: MEDICARE

## 2025-06-05 ENCOUNTER — LAB VISIT (OUTPATIENT)
Dept: LAB | Facility: HOSPITAL | Age: 60
End: 2025-06-05
Attending: INTERNAL MEDICINE
Payer: MEDICARE

## 2025-06-05 DIAGNOSIS — C61 PROSTATE CANCER: ICD-10-CM

## 2025-06-05 LAB
ABSOLUTE EOSINOPHIL (OHS): 0.1 K/UL
ABSOLUTE MONOCYTE (OHS): 0.62 K/UL (ref 0.3–1)
ABSOLUTE NEUTROPHIL COUNT (OHS): 4.26 K/UL (ref 1.8–7.7)
ALBUMIN SERPL BCP-MCNC: 3.8 G/DL (ref 3.5–5.2)
ALP SERPL-CCNC: 42 UNIT/L (ref 40–150)
ALT SERPL W/O P-5'-P-CCNC: 21 UNIT/L (ref 10–44)
ANION GAP (OHS): 11 MMOL/L (ref 8–16)
AST SERPL-CCNC: 19 UNIT/L (ref 11–45)
BASOPHILS # BLD AUTO: 0.02 K/UL
BASOPHILS NFR BLD AUTO: 0.3 %
BILIRUB SERPL-MCNC: 0.4 MG/DL (ref 0.1–1)
BUN SERPL-MCNC: 24 MG/DL (ref 8–23)
CALCIUM SERPL-MCNC: 8.6 MG/DL (ref 8.7–10.5)
CHLORIDE SERPL-SCNC: 107 MMOL/L (ref 95–110)
CO2 SERPL-SCNC: 24 MMOL/L (ref 23–29)
CREAT SERPL-MCNC: 0.8 MG/DL (ref 0.5–1.4)
ERYTHROCYTE [DISTWIDTH] IN BLOOD BY AUTOMATED COUNT: 12.4 % (ref 11.5–14.5)
GFR SERPLBLD CREATININE-BSD FMLA CKD-EPI: >60 ML/MIN/1.73/M2
GLUCOSE SERPL-MCNC: 101 MG/DL (ref 70–110)
HCT VFR BLD AUTO: 37.6 % (ref 40–54)
HGB BLD-MCNC: 12.9 GM/DL (ref 14–18)
IMM GRANULOCYTES # BLD AUTO: 0.01 K/UL (ref 0–0.04)
IMM GRANULOCYTES NFR BLD AUTO: 0.1 % (ref 0–0.5)
LYMPHOCYTES # BLD AUTO: 1.71 K/UL (ref 1–4.8)
MCH RBC QN AUTO: 30.3 PG (ref 27–31)
MCHC RBC AUTO-ENTMCNC: 34.3 G/DL (ref 32–36)
MCV RBC AUTO: 88 FL (ref 82–98)
NUCLEATED RBC (/100WBC) (OHS): 0 /100 WBC
PLATELET # BLD AUTO: 224 K/UL (ref 150–450)
PMV BLD AUTO: 9.4 FL (ref 9.2–12.9)
POTASSIUM SERPL-SCNC: 3.5 MMOL/L (ref 3.5–5.1)
PROT SERPL-MCNC: 6.7 GM/DL (ref 6–8.4)
RBC # BLD AUTO: 4.26 M/UL (ref 4.6–6.2)
RELATIVE EOSINOPHIL (OHS): 1.5 %
RELATIVE LYMPHOCYTE (OHS): 25.4 % (ref 18–48)
RELATIVE MONOCYTE (OHS): 9.2 % (ref 4–15)
RELATIVE NEUTROPHIL (OHS): 63.5 % (ref 38–73)
SODIUM SERPL-SCNC: 142 MMOL/L (ref 136–145)
WBC # BLD AUTO: 6.72 K/UL (ref 3.9–12.7)

## 2025-06-05 PROCEDURE — 85025 COMPLETE CBC W/AUTO DIFF WBC: CPT | Mod: PN

## 2025-06-05 PROCEDURE — 82040 ASSAY OF SERUM ALBUMIN: CPT | Mod: PN

## 2025-06-05 PROCEDURE — 36415 COLL VENOUS BLD VENIPUNCTURE: CPT | Mod: PN

## 2025-06-17 ENCOUNTER — TELEPHONE (OUTPATIENT)
Dept: HEMATOLOGY/ONCOLOGY | Facility: CLINIC | Age: 60
End: 2025-06-17
Payer: MEDICARE

## 2025-06-17 NOTE — TELEPHONE ENCOUNTER
Copied from CRM #[id number]. Topic: Appointments - Appointment Confirmation  >> Jun 17, 2025 10:57 AM Christa wrote:  Type:  Needs Medical Advice    Who Called: pt  Symptoms (please be specific):    How long has patient had these symptoms:    Pharmacy name and phone #:    Would the patient rather a call back or a response via [facility]? call  Best Call Back Number: [phone]    Additional Information: pt states that he is calling to confirm that he will be at his appt next Tuesday

## 2025-06-20 ENCOUNTER — PATIENT MESSAGE (OUTPATIENT)
Dept: HEMATOLOGY/ONCOLOGY | Facility: CLINIC | Age: 60
End: 2025-06-20
Payer: MEDICARE

## 2025-06-24 ENCOUNTER — OFFICE VISIT (OUTPATIENT)
Dept: HEMATOLOGY/ONCOLOGY | Facility: CLINIC | Age: 60
End: 2025-06-24
Payer: MEDICARE

## 2025-06-24 DIAGNOSIS — R23.2 HOT FLASHES: ICD-10-CM

## 2025-06-24 DIAGNOSIS — M54.59 OTHER LOW BACK PAIN: ICD-10-CM

## 2025-06-24 DIAGNOSIS — C61 PROSTATE CANCER: Primary | ICD-10-CM

## 2025-06-24 DIAGNOSIS — R53.83 FATIGUE, UNSPECIFIED TYPE: ICD-10-CM

## 2025-06-24 PROCEDURE — 99214 OFFICE O/P EST MOD 30 MIN: CPT | Mod: S$PBB,,, | Performed by: NURSE PRACTITIONER

## 2025-06-24 PROCEDURE — 99999 PR PBB SHADOW E&M-EST. PATIENT-LVL II: CPT | Mod: PBBFAC,,, | Performed by: NURSE PRACTITIONER

## 2025-06-24 PROCEDURE — 99212 OFFICE O/P EST SF 10 MIN: CPT | Mod: PBBFAC,PN | Performed by: NURSE PRACTITIONER

## 2025-06-24 NOTE — PROGRESS NOTES
Prostate Cancer Shared Medical Visit  Ronald Martinez  60 y.o. is here to seek an integrative approach to discuss side effects related to prostate cancer diagnosis and treatment.     HPI  Ronald Martinez has a history of prostate cancer and is taking Zytiga along with Lupron injections.      Past Medical History  Past Medical History:   Diagnosis Date    Cancer     prostate      Past Surgical History   Past Surgical History:   Procedure Laterality Date    CYST REMOVAL      TRANSRECTAL BIOPSY OF PROSTATE WITH ULTRASOUND GUIDANCE N/A 9/27/2024    Procedure: BIOPSY, PROSTATE, RECTAL APPROACH, WITH US GUIDANCE;  Surgeon: ANDREA Haas MD;  Location: [facility];  Service: Urology;  Laterality: N/A;      Family History   Family History   Problem Relation Name Age of Onset    Atrial fibrillation Mother      Deep vein thrombosis Father      Heart attack Father      Kidney nephrosis Brother        Allergies  Review of patient's allergies indicates:  No Known Allergies   Current Medications:  Current Medications[1]     Review of Systems  Review of Systems   Constitutional:  Positive for malaise/fatigue.        Hot flashes   HENT: Negative.     Eyes: Negative.    Respiratory: Negative.     Cardiovascular: Negative.    Gastrointestinal: Negative.    Genitourinary: Negative.    Musculoskeletal:  Positive for back pain and joint pain.   Skin: Negative.    Neurological: Negative.    Endo/Heme/Allergies: Negative.    Psychiatric/Behavioral: Negative.          Physical Exam      Physical Exam  Vitals reviewed.   Constitutional:       Appearance: Normal appearance.   Neurological:      Mental Status: He is alert.   Psychiatric:         Mood and Affect: Mood normal.         Behavior: Behavior normal.        ASSESSMENT :  1. Prostate cancer    2. Hot flashes    3. Fatigue, unspecified type    4. Other low back pain      The Health Topic was Cardiac health and Palliative Care  Today our exercise and mind body activity was Adrián Chi to  reduce stress, increase flexibility and muscle strength, improve balance, and promote serenity.   DIVYA Coughlin spoke to the group regarding possible increase risk of cardiac disease due to age, genetics, blood pressure, cholesterol, chemotherapy, and low testosterone. Patients were educated on when and why patients should follow up with a cardiologist.   Kesha Espinoza PT spoke to the group encouraging them to exercise and explaining the importance of getting 150 minutes of cardio exercise weekly along with 2 days of strength training. She gave handouts and instructed on how to find the target heart rate for exercising.   Susie Slaughter RD educated the group on a heart healthy diet including decreasing saturated fats, increasing unsaturated fats, increasing fiber, decreasing sugar, encouraging lean meats and the importance of healthy preparation of food and portion size. Overnight Oats with fruits and nuts as toppings provided by COURTNEY Luna with recipe  Dr Coronel, palliative care, spoke to the group on the benefits of palliative care and the side effects they can treat to including, but not limited to,  insomnia, fatigue, pain, appetite, anxiety, assessment of support, and advanced directives.   Spiritual Care provided by  Roma Castaneda.    During the visit today the patient received emotional support from the group, guided support, group discussion as well as personalized instructions from the nurse practitioner and ancillary support on individualized symptoms, risk factors and potential side effects of treatment. Follow-up will be arranged as specified below.      PLAN:  Reviewed all information discussed at today's visit and all questions were answered.    Encouraged exercising the goal of at least 150 minutes per week of moderate intensity aerobic activity or at least 75 minutes of vigorous activity,   I discussed and recommended the following support services:  Adrián Chi and Yoga I suggested Adrián  Chi and/or Yoga as these practices reduce stress, increases flexibility and muscle strength, improves balance and promotes serenity in the power of movement to help fight disease and boost your immune system.   Music and relaxation therapy and Meditation which can decrease stress by lowering blood pressure, slowing breathing, and helping you be more present in the moment. It improves sleep by relaxing the body and mind at the end of the day.Meditation also trains you how to focus on one thing at a time, improving concentration. It also promotes emotional well-being by decreasing depression and anxiety, and helping create a more positive outlook on life.    Recommended palliative care and acupuncture referral.   Also recommended cardio oncology referral. He will let me know if he would like any of these referrals.     Follow up with [facility] as needed          [1]   Current Outpatient Medications:     abiraterone (ZYTIGA) 500 mg Tab, Take 2 tablets (1,000 mg total) by mouth once daily., Disp: 60 tablet, Rfl: 6    acetaminophen (TYLENOL) 500 MG tablet, Take 500 mg by mouth 2 (two) times a day., Disp: , Rfl:     aspirin (ECOTRIN) 81 MG EC tablet, Take 81 mg by mouth once daily., Disp: , Rfl:     calcium carbonate (OS-GORDON) 500 mg calcium (1,250 mg) tablet, Take 1 tablet by mouth 2 (two) times daily., Disp: , Rfl:     cholecalciferol, vitamin D3, (VITAMIN D3) 10 mcg (400 unit) Tab, Take by mouth 2 (two) times a day., Disp: , Rfl:     predniSONE (DELTASONE) 5 MG tablet, Take 1 tablet (5 mg total) by mouth once daily., Disp: 30 tablet, Rfl: 6    tamsulosin (FLOMAX) 0.4 mg Cap, Take 1 capsule (0.4 mg total) by mouth once daily., Disp: 90 capsule, Rfl: 0

## 2025-06-26 ENCOUNTER — PATIENT MESSAGE (OUTPATIENT)
Dept: HEMATOLOGY/ONCOLOGY | Facility: CLINIC | Age: 60
End: 2025-06-26
Payer: MEDICARE

## 2025-07-03 ENCOUNTER — LAB VISIT (OUTPATIENT)
Dept: LAB | Facility: HOSPITAL | Age: 60
End: 2025-07-03
Attending: INTERNAL MEDICINE
Payer: MEDICARE

## 2025-07-03 DIAGNOSIS — C61 PROSTATE CANCER: ICD-10-CM

## 2025-07-03 LAB
ABSOLUTE EOSINOPHIL (OHS): 0.15 K/UL
ABSOLUTE MONOCYTE (OHS): 0.66 K/UL (ref 0.3–1)
ABSOLUTE NEUTROPHIL COUNT (OHS): 4.02 K/UL (ref 1.8–7.7)
ALBUMIN SERPL BCP-MCNC: 3.8 G/DL (ref 3.5–5.2)
ALP SERPL-CCNC: 41 UNIT/L (ref 40–150)
ALT SERPL W/O P-5'-P-CCNC: 25 UNIT/L (ref 10–44)
ANION GAP (OHS): 12 MMOL/L (ref 8–16)
AST SERPL-CCNC: 21 UNIT/L (ref 11–45)
BASOPHILS # BLD AUTO: 0.08 K/UL
BASOPHILS NFR BLD AUTO: 1.2 %
BILIRUB SERPL-MCNC: 0.5 MG/DL (ref 0.1–1)
BUN SERPL-MCNC: 23 MG/DL (ref 8–23)
CALCIUM SERPL-MCNC: 8.8 MG/DL (ref 8.7–10.5)
CHLORIDE SERPL-SCNC: 108 MMOL/L (ref 95–110)
CO2 SERPL-SCNC: 21 MMOL/L (ref 23–29)
CREAT SERPL-MCNC: 0.8 MG/DL (ref 0.5–1.4)
ERYTHROCYTE [DISTWIDTH] IN BLOOD BY AUTOMATED COUNT: 12.4 % (ref 11.5–14.5)
GFR SERPLBLD CREATININE-BSD FMLA CKD-EPI: >60 ML/MIN/1.73/M2
GLUCOSE SERPL-MCNC: 108 MG/DL (ref 70–110)
HCT VFR BLD AUTO: 38.2 % (ref 40–54)
HGB BLD-MCNC: 13.1 GM/DL (ref 14–18)
IMM GRANULOCYTES # BLD AUTO: 0.01 K/UL (ref 0–0.04)
IMM GRANULOCYTES NFR BLD AUTO: 0.2 % (ref 0–0.5)
LYMPHOCYTES # BLD AUTO: 1.58 K/UL (ref 1–4.8)
MCH RBC QN AUTO: 30.5 PG (ref 27–31)
MCHC RBC AUTO-ENTMCNC: 34.3 G/DL (ref 32–36)
MCV RBC AUTO: 89 FL (ref 82–98)
NUCLEATED RBC (/100WBC) (OHS): 0 /100 WBC
PLATELET # BLD AUTO: 224 K/UL (ref 150–450)
PMV BLD AUTO: 9.5 FL (ref 9.2–12.9)
POTASSIUM SERPL-SCNC: 3.6 MMOL/L (ref 3.5–5.1)
PROT SERPL-MCNC: 6.5 GM/DL (ref 6–8.4)
RBC # BLD AUTO: 4.29 M/UL (ref 4.6–6.2)
RELATIVE EOSINOPHIL (OHS): 2.3 %
RELATIVE LYMPHOCYTE (OHS): 24.3 % (ref 18–48)
RELATIVE MONOCYTE (OHS): 10.2 % (ref 4–15)
RELATIVE NEUTROPHIL (OHS): 61.8 % (ref 38–73)
SODIUM SERPL-SCNC: 141 MMOL/L (ref 136–145)
WBC # BLD AUTO: 6.5 K/UL (ref 3.9–12.7)

## 2025-07-03 PROCEDURE — 85025 COMPLETE CBC W/AUTO DIFF WBC: CPT | Mod: PN

## 2025-07-03 PROCEDURE — 82040 ASSAY OF SERUM ALBUMIN: CPT | Mod: PN

## 2025-07-03 PROCEDURE — 36415 COLL VENOUS BLD VENIPUNCTURE: CPT | Mod: PN

## 2025-07-17 ENCOUNTER — PATIENT MESSAGE (OUTPATIENT)
Dept: HEMATOLOGY/ONCOLOGY | Facility: CLINIC | Age: 60
End: 2025-07-17
Payer: MEDICARE

## 2025-07-22 ENCOUNTER — PATIENT MESSAGE (OUTPATIENT)
Dept: HEMATOLOGY/ONCOLOGY | Facility: CLINIC | Age: 60
End: 2025-07-22
Payer: MEDICARE

## 2025-07-22 DIAGNOSIS — C61 PROSTATE CANCER: ICD-10-CM

## 2025-07-22 DIAGNOSIS — G89.3 CANCER RELATED PAIN: Primary | ICD-10-CM

## 2025-07-23 ENCOUNTER — TELEPHONE (OUTPATIENT)
Dept: PALLIATIVE MEDICINE | Facility: CLINIC | Age: 60
End: 2025-07-23
Payer: MEDICARE

## 2025-07-23 ENCOUNTER — TELEPHONE (OUTPATIENT)
Dept: HEMATOLOGY/ONCOLOGY | Facility: CLINIC | Age: 60
End: 2025-07-23
Payer: MEDICARE

## 2025-07-23 NOTE — TELEPHONE ENCOUNTER
Copied from CRM #[id number]. Topic: General Inquiry - Patient Advice  >> Jul 23, 2025  2:54 PM Tori Macias wrote:  Type: Needs Medical Advice  Who Called:  emani    Jose Call Back Number: [phone]      Additional Information: emani states he received a called for palltive care on s.s , but  would like to have it on NS , please call to further assist thank you .

## 2025-08-04 ENCOUNTER — LAB VISIT (OUTPATIENT)
Dept: LAB | Facility: HOSPITAL | Age: 60
End: 2025-08-04
Attending: INTERNAL MEDICINE
Payer: MEDICARE

## 2025-08-04 DIAGNOSIS — D64.9 NORMOCYTIC ANEMIA: ICD-10-CM

## 2025-08-04 DIAGNOSIS — C61 PROSTATE CANCER: ICD-10-CM

## 2025-08-04 LAB
ABSOLUTE EOSINOPHIL (OHS): 0.11 K/UL
ABSOLUTE MONOCYTE (OHS): 0.58 K/UL (ref 0.3–1)
ABSOLUTE NEUTROPHIL COUNT (OHS): 3.44 K/UL (ref 1.8–7.7)
ALBUMIN SERPL BCP-MCNC: 3.7 G/DL (ref 3.5–5.2)
ALP SERPL-CCNC: 30 UNIT/L (ref 40–150)
ALT SERPL W/O P-5'-P-CCNC: 21 UNIT/L (ref 10–44)
ANION GAP (OHS): 8 MMOL/L (ref 8–16)
AST SERPL-CCNC: 20 UNIT/L (ref 11–45)
BASOPHILS # BLD AUTO: 0.05 K/UL
BASOPHILS NFR BLD AUTO: 0.9 %
BILIRUB SERPL-MCNC: 0.5 MG/DL (ref 0.1–1)
BUN SERPL-MCNC: 15 MG/DL (ref 8–23)
CALCIUM SERPL-MCNC: 9.1 MG/DL (ref 8.7–10.5)
CHLORIDE SERPL-SCNC: 107 MMOL/L (ref 95–110)
CO2 SERPL-SCNC: 26 MMOL/L (ref 23–29)
CREAT SERPL-MCNC: 0.8 MG/DL (ref 0.5–1.4)
ERYTHROCYTE [DISTWIDTH] IN BLOOD BY AUTOMATED COUNT: 12.6 % (ref 11.5–14.5)
GFR SERPLBLD CREATININE-BSD FMLA CKD-EPI: >60 ML/MIN/1.73/M2
GLUCOSE SERPL-MCNC: 98 MG/DL (ref 70–110)
HCT VFR BLD AUTO: 37.2 % (ref 40–54)
HGB BLD-MCNC: 12.9 GM/DL (ref 14–18)
IMM GRANULOCYTES # BLD AUTO: 0.01 K/UL (ref 0–0.04)
IMM GRANULOCYTES NFR BLD AUTO: 0.2 % (ref 0–0.5)
LYMPHOCYTES # BLD AUTO: 1.62 K/UL (ref 1–4.8)
MCH RBC QN AUTO: 31 PG (ref 27–31)
MCHC RBC AUTO-ENTMCNC: 34.7 G/DL (ref 32–36)
MCV RBC AUTO: 89 FL (ref 82–98)
NUCLEATED RBC (/100WBC) (OHS): 0 /100 WBC
PLATELET # BLD AUTO: 186 K/UL (ref 150–450)
PMV BLD AUTO: 10.4 FL (ref 9.2–12.9)
POTASSIUM SERPL-SCNC: 3.9 MMOL/L (ref 3.5–5.1)
PROT SERPL-MCNC: 6.5 GM/DL (ref 6–8.4)
PSA SERPL-MCNC: 0.19 NG/ML
RBC # BLD AUTO: 4.16 M/UL (ref 4.6–6.2)
RELATIVE EOSINOPHIL (OHS): 1.9 %
RELATIVE LYMPHOCYTE (OHS): 27.9 % (ref 18–48)
RELATIVE MONOCYTE (OHS): 10 % (ref 4–15)
RELATIVE NEUTROPHIL (OHS): 59.1 % (ref 38–73)
SODIUM SERPL-SCNC: 141 MMOL/L (ref 136–145)
WBC # BLD AUTO: 5.81 K/UL (ref 3.9–12.7)

## 2025-08-04 PROCEDURE — 36415 COLL VENOUS BLD VENIPUNCTURE: CPT | Mod: PN

## 2025-08-04 PROCEDURE — 85025 COMPLETE CBC W/AUTO DIFF WBC: CPT | Mod: PN

## 2025-08-04 PROCEDURE — 84520 ASSAY OF UREA NITROGEN: CPT | Mod: PN

## 2025-08-04 PROCEDURE — 84153 ASSAY OF PSA TOTAL: CPT

## 2025-08-06 ENCOUNTER — OFFICE VISIT (OUTPATIENT)
Dept: PALLIATIVE MEDICINE | Facility: CLINIC | Age: 60
End: 2025-08-06
Payer: MEDICARE

## 2025-08-06 DIAGNOSIS — C61 PROSTATE CANCER: ICD-10-CM

## 2025-08-06 DIAGNOSIS — G89.3 CANCER RELATED PAIN: ICD-10-CM

## 2025-08-06 DIAGNOSIS — Z51.5 PALLIATIVE CARE BY SPECIALIST: Primary | ICD-10-CM

## 2025-08-06 RX ORDER — CELECOXIB 100 MG/1
100 CAPSULE ORAL 2 TIMES DAILY PRN
Qty: 60 CAPSULE | Refills: 0 | Status: SHIPPED | OUTPATIENT
Start: 2025-08-06 | End: 2025-08-06 | Stop reason: SDUPTHER

## 2025-08-06 RX ORDER — CELECOXIB 100 MG/1
CAPSULE ORAL
Qty: 180 CAPSULE | Refills: 0 | Status: SHIPPED | OUTPATIENT
Start: 2025-08-06

## 2025-08-06 NOTE — PROGRESS NOTES
PATIENT: Ronald Martinez  MRN: [id number]  DATE: 8/7/2025      Diagnosis:   1. Prostate cancer    2. Metastasis to bone    3. Normocytic anemia        Chief Complaint: No chief complaint on file.          Subjective:    Interval History: Mr. Martinez is a 60 y.o. male who returns for follow up. Reports feeling well overall. He continues to have bone pain which is sporadic. He continues to stay active, exercising daily. Denies fever, chills, sob, palpitations, swelling,  n/v, diarrhea, constipation,  new bumps, lumps, bleeding, bruising.     Oncologic History:   Per Record:   60 y.o. male presets for metastatic prostate cancer.  Since the last clinic visit the patient underwent PSMA PET/CT on 5/02/25 showing decrease in hypermetabolic activity in the prostate, resolution of previous pelvic sidewall lymphadenopathy; decrease in degree of hypermetabolic activity in sclerotic lesion of the right pubic symphysis and improvement in lesion inferior pubic ramus in the right.     PSA on 10/20/2023 was 64.9 ng/mL.  The patient underwent MRI prostate 09/06/2024 showing a large lesion involving the majority of the prostate gland.  The patient underwent prostate biopsy on 09/27/2024 showing prostatic adenocarcinoma Holstein score 4+5=9 grade group 5.  Patient underwent PSMA PET-CT on 10/04/2024 showing markedly enlarged prostate gland with diffuse radiotracer uptake; metastatic right pelvic lymph node measuring 2.8 x 1.4 cm; left internal iliac node measuring 1.9 x 1.2 cm; left external iliac lymph node measuring 2.5 x 1.5 cm; infiltrative lesion in the right inferior pubic ramus extending into the pubic symphysis; nodular focus of radiotracer accumulation in the left inferior pubic ramus; punctate focus of radiotracer accumulation in the right iliac bone; nodular focus of radiotracer accumulation within the left lamina of S1.                         PSA on 10/18/24 was 213ng/mL.  [facility] returned negative for germline mutations  tested.  TEMPUS blood was negative.  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              PSA was 3ng/mL on 1/06/25.    Oncology History   Prostate cancer   10/18/2024 Initial Diagnosis    Prostate cancer     10/20/2024 Cancer Staged    Staging form: Prostate, AJCC 8th Edition  - Clinical: Stage IVB (cTX, cN1, pM1b, Grade Group: 5)         Past Medical History:   Past Medical History:   Diagnosis Date    Cancer     prostate       Past Surgical HIstory:   Past Surgical History:   Procedure Laterality Date    CYST REMOVAL      TRANSRECTAL BIOPSY OF PROSTATE WITH ULTRASOUND GUIDANCE N/A 9/27/2024    Procedure: BIOPSY, PROSTATE, RECTAL APPROACH, WITH US GUIDANCE;  Surgeon: ANDREA Haas MD;  Location: [facility];  Service: Urology;  Laterality: N/A;       Family History:   Family History   Problem Relation Name Age of Onset    Atrial fibrillation Mother      Deep vein thrombosis Father      Heart attack Father      Kidney nephrosis Brother         Social History:  reports that he quit smoking about 50 years ago. His smoking use included cigarettes. He started smoking about 44 years ago. He has never used smokeless tobacco. He reports that he does not currently use alcohol after a past usage of about 7.0 standard drinks of alcohol per week. He reports that he does not currently use drugs.    Allergies:  Review of patient's allergies indicates:  No Known Allergies    Medications:  Current Medications[1]    Review of Systems   Constitutional:  Negative for appetite change, chills, fever and unexpected weight change.   HENT:  Negative for mouth sores.    Eyes:  Negative for visual disturbance.   Respiratory:  Negative for cough and shortness of breath.    Cardiovascular:  Negative for chest pain, palpitations and leg swelling.   Gastrointestinal:  Positive for abdominal pain. Negative for constipation, diarrhea and nausea.   Genitourinary:  Positive for frequency.   Musculoskeletal:  Positive for  back pain.   Skin:  Negative for rash.   Neurological:  Negative for headaches.   Hematological:  Negative for adenopathy.   Psychiatric/Behavioral:  The patient is not nervous/anxious.        ECOG Performance Status:   ECOG SCORE             Objective:      Vitals: There were no vitals filed for this visit.  BMI: There is no height or weight on file to calculate BMI.    Physical Exam  HENT:      Head: Normocephalic.      Mouth/Throat:      Mouth: Mucous membranes are moist.      Pharynx: Oropharynx is clear.   Cardiovascular:      Rate and Rhythm: Normal rate and regular rhythm.      Heart sounds: Normal heart sounds.   Pulmonary:      Effort: Pulmonary effort is normal.      Breath sounds: Normal breath sounds.   Abdominal:      General: Bowel sounds are normal.   Musculoskeletal:         General: Normal range of motion.   Skin:     General: Skin is warm and dry.   Neurological:      Mental Status: He is alert and oriented to person, place, and time.   Psychiatric:         Behavior: Behavior normal.         Laboratory Data:  No visits with results within 3 Day(s) from this visit.   Latest known visit with results is:   Lab Visit on 08/04/2025   Component Date Value Ref Range Status    Prostate Specific Antigen 08/04/2025 0.19  <=4.00 ng/mL Final    Sodium 08/04/2025 141  136 - 145 mmol/L Final    Potassium 08/04/2025 3.9  3.5 - 5.1 mmol/L Final    Chloride 08/04/2025 107  95 - 110 mmol/L Final    CO2 08/04/2025 26  23 - 29 mmol/L Final    Glucose 08/04/2025 98  70 - 110 mg/dL Final    BUN 08/04/2025 15  8 - 23 mg/dL Final    Creatinine 08/04/2025 0.8  0.5 - 1.4 mg/dL Final    Calcium 08/04/2025 9.1  8.7 - 10.5 mg/dL Final    Protein Total 08/04/2025 6.5  6.0 - 8.4 gm/dL Final    Albumin 08/04/2025 3.7  3.5 - 5.2 g/dL Final    Bilirubin Total 08/04/2025 0.5  0.1 - 1.0 mg/dL Final    ALP 08/04/2025 30 (L)  40 - 150 unit/L Final    AST 08/04/2025 20  11 - 45 unit/L Final    ALT 08/04/2025 21  10 - 44 unit/L Final     Anion Gap 08/04/2025 8  8 - 16 mmol/L Final    eGFR 08/04/2025 >60  >60 mL/min/1.73/m2 Final    WBC 08/04/2025 5.81  3.90 - 12.70 K/uL Final    RBC 08/04/2025 4.16 (L)  4.60 - 6.20 M/uL Final    HGB 08/04/2025 12.9 (L)  14.0 - 18.0 gm/dL Final    HCT 08/04/2025 37.2 (L)  40.0 - 54.0 % Final    MCV 08/04/2025 89  82 - 98 fL Final    MCH 08/04/2025 31.0  27.0 - 31.0 pg Final    MCHC 08/04/2025 34.7  32.0 - 36.0 g/dL Final    RDW 08/04/2025 12.6  11.5 - 14.5 % Final    Platelet Count 08/04/2025 186  150 - 450 K/uL Final    MPV 08/04/2025 10.4  9.2 - 12.9 fL Final    Nucleated RBC 08/04/2025 0  <=0 /100 WBC Final    Neut % 08/04/2025 59.1  38 - 73 % Final    Lymph % 08/04/2025 27.9  18 - 48 % Final    Mono % 08/04/2025 10.0  4 - 15 % Final    Eos % 08/04/2025 1.9  <=8 % Final    Basophil % 08/04/2025 0.9  <=1.9 % Final    Imm Grans % 08/04/2025 0.2  0.0 - 0.5 % Final    Neut # 08/04/2025 3.44  1.8 - 7.7 K/uL Final    Lymph # 08/04/2025 1.62  1 - 4.8 K/uL Final    Mono # 08/04/2025 0.58  0.3 - 1 K/uL Final    Eos # 08/04/2025 0.11  <=0.5 K/uL Final    Baso # 08/04/2025 0.05  <=0.2 K/uL Final    Imm Grans # 08/04/2025 0.01  0.00 - 0.04 K/uL Final          Imaging: Reviewed   Assessment:       1. Prostate cancer    2. Metastasis to bone    3. Normocytic anemia           Plan:     Prostate Cancer - the patient with metastatic castrate sensitive prostate cancer with lesions in pelvic lymph nodes, right inferior veer pubic ramus, left inferior pubic ramus, right iliac bone, and lamina of S1  -The patient does not have high volume disease outside of the pelvis in his a candidate for abiraterone and prednisone along with Lupron  -[facility] returned negative for germline mutations tested  -TEMPUS blood was negative.  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     Visit today included increased complexity associated with the care of the episodic problem (ADT and Abiratoerone/prednisone) addressed and managing the longitudinal care of the patient due to the serious and/or complex managed problem(s) prostate cancer.     Bone Metastases - Pt candidate for Xgeva  -Pt on Calcium 1,000mg daily and Vitamin D 800IU daily  -Pt cleared by dentist for Xgeva     Anemia - Likely due to ADT and chronic disease  -Hgb 12.9 on 8/04/25  -Stable  -will monitor        Med Onc Chart Routing      Follow up with physician . 11/6/25 with Dr Daniel with repeat cbc, cmp, psa at least 1 day prior to treatment. Needs cbc, cmp in 4 weeks and 8 weeks without visit.   Follow up with FEL[facility]    Infusion scheduling note   Ok for lupron and xgeva.   Injection scheduling note    Labs    Imaging    Pharmacy appointment    Other referrals                Plan was discussed with the patient at length, and he verbalized understanding. Ronald was given an opportunity to ask questions that were answered to his satisfaction, and he was advised to call in the interval if any problems or questions arise.    Assessment/Plan reviewed and approved by Dr Daniel    25 minutes were spent in coordination of patient's care, record review and counseling.    SUZANNE Winchester, FNP-C  Hematology & Oncology         [1]   Current Outpatient Medications   Medication Sig Dispense Refill    abiraterone (ZYTIGA) 500 mg Tab Take 2 tablets (1,000 mg total) by mouth once daily. 60 tablet 6    acetaminophen (TYLENOL) 500 MG tablet Take 500 mg by mouth 2 (two) times a day.      calcium carbonate (OS-GORDON) 500 mg calcium (1,250 mg) tablet Take 1 tablet by mouth 2 (two) times daily.      celecoxib (CELEBREX) 100 MG capsule TAKE 1 CAPSULE(100 MG) BY MOUTH TWICE DAILY AS NEEDED FOR PAIN 180 capsule 0    cholecalciferol, vitamin D3, (VITAMIN D3) 10 mcg (400 unit) Tab Take by mouth 2 (two)  times a day.      predniSONE (DELTASONE) 5 MG tablet Take 1 tablet (5 mg total) by mouth once daily. 30 tablet 6    tamsulosin (FLOMAX) 0.4 mg Cap Take 1 capsule (0.4 mg total) by mouth once daily. 90 capsule 0     No current facility-administered medications for this visit.

## 2025-08-06 NOTE — PROGRESS NOTES
Office Visit  [facility] Palliative Care      Consult Requested By: Dr. Jose Alberto Daniel  Reason for Consult: symptom management      ASSESSMENT/PLAN:     Plan/Recommendations:  Diagnoses and all orders for this visit:    Palliative care by specialist    Cancer related pain  -     Ambulatory referral/consult to Palliative Care    Prostate cancer  -     Ambulatory referral/consult to Palliative Care    Other orders  -     Discontinue: celecoxib (CELEBREX) 100 MG capsule; Take 1 capsule (100 mg total) by mouth 2 (two) times daily as needed for Pain.      # Palliative care by specialist  # Metastatic prostate cancer  Patient with advanced prostate cancer with known metastasis. ECOG 0. Overall doing well. Discussed prognosis in prostate cancer.  - Follow up oncology    # Cancer related pain  Patient reports arthralgia, worse in the morning. Takes tylenol and aspirin with some relief. Discussed multimodal approach for pain control.   - start celebrex 100mg PO BID PRN  - continue APAP 825mg PO BID    # Advance care planning  Discussed importance of advance care planning including serious illness conversations with patient and/or next of kin including interventions such as CPR and mechanical ventilation. Also discussed the importance of documentation of these wishes in a formal living will. Additional documents including LAPOST, HCPOA and Five Wishes were also discussed briefly. Confirmed DNR code status. Has delegated his brother as his surrogate decision maker.  - Send ACP info to address on file  - [facility] for LAPOST completion    Follow up: 2 weeks, 1 week RN    Patient's encounter and above plan of care discussed with patient.    SUBJECTIVE:     History of Present Illness:  Patient is a 60 y.o. year old male presenting with a history of metastatic prostate cancer. Presents to [facility] care.     Palliative Discussion:    Introduced the role of palliative care including symptom management, advance care planning  and goals of care elucidation.    Referred for pain. Saw me during shared medical visit.    Pain is in stomach and lower back. Pain is most consistent here. Pain can also move around. Feels it is related to Lupron. Has been on this since November. Describes pain as stabbing, aching or burning. It changes. Pain is worst upon awakening for the first 2-3 hours. Takes 825mg acetaminophen and 81mg aspirin after breakfast and dinner. Pain was severe a few weeks ago. Has lightened up recently. Cut out nuts because heard it causes inflammation.     PCP is Dr. Mariano. Had a rheumatoid workup. Was negative.     Last time he had pain medication in 2011 for a tooth abscess.     Denies nausea, vomiting. Had diarrhea which has resolved which was related to antidepressant. Has 2-3 Bms in the morning.     Would delegate his brother Pedro Martinez as surrogate decision maker. Recently made a living will but needs to be notarized. Did not include healthcare wishes in it.     Has POLST form completed with DNR on it but not has PCP signed it.     ROS:  Review of Systems   Constitutional:  Negative for appetite change and unexpected weight change.   HENT:  Negative for mouth sores.    Eyes:  Negative for visual disturbance.   Respiratory:  Negative for cough and shortness of breath.    Cardiovascular:  Negative for chest pain.   Gastrointestinal:  Positive for abdominal pain. Negative for diarrhea.   Genitourinary:  Positive for frequency.   Musculoskeletal:  Positive for back pain.   Skin:  Negative for rash.   Neurological:  Negative for headaches.   Hematological:  Negative for adenopathy.   Psychiatric/Behavioral:  The patient is not nervous/anxious.        Past Medical History:   Diagnosis Date    Cancer     prostate     Past Surgical History:   Procedure Laterality Date    CYST REMOVAL      TRANSRECTAL BIOPSY OF PROSTATE WITH ULTRASOUND GUIDANCE N/A 9/27/2024    Procedure: BIOPSY, PROSTATE, RECTAL APPROACH, WITH US GUIDANCE;   Surgeon: ANDREA Haas MD;  Location: [facility];  Service: Urology;  Laterality: N/A;     Family History   Problem Relation Name Age of Onset    Atrial fibrillation Mother      Deep vein thrombosis Father      Heart attack Father      Kidney nephrosis Brother       Review of patient's allergies indicates:  No Known Allergies  Social Drivers of Health     Tobacco Use: Medium Risk (8/7/2025)    Patient History     Smoking Tobacco Use: Former     Smokeless Tobacco Use: Never     Passive Exposure: Not on file   Alcohol Use: Not At Risk (2/12/2025)    AUDIT-C     Frequency of Alcohol Consumption: 4 or more times a week     Average Number of Drinks: 1 or 2     Frequency of Binge Drinking: Never   Financial Resource Strain: Low Risk  (2/12/2025)    Overall Financial Resource Strain (CARDIA)     Difficulty of Paying Living Expenses: Not hard at all   Food Insecurity: No Food Insecurity (2/12/2025)    Hunger Vital Sign     Worried About Running Out of Food in the Last Year: Never true     Ran Out of Food in the Last Year: Never true   Transportation Needs: No Transportation Needs (2/12/2025)    PRAPARE - Transportation     Lack of Transportation (Medical): No     Lack of Transportation (Non-Medical): No   Physical Activity: Sufficiently Active (2/12/2025)    Exercise Vital Sign     Days of Exercise per Week: 7 days     Minutes of Exercise per Session: 30 min   Stress: No Stress Concern Present (2/12/2025)    [employer] [place] [employer] - Occupational Stress Questionnaire     Feeling of Stress : Not at all   Housing Stability: Low Risk  (2/12/2025)    Housing Stability Vital Sign     Unable to Pay for Housing in the Last Year: No     Number of Times Moved in the Last Year: 0     Homeless in the Last Year: No   Depression: Low Risk  (8/7/2025)    Depression     Last PHQ-4: Flowsheet Data: 0   Utilities: Not At Risk (2/12/2025)    [facility] Utilities     Threatened with loss of utilities: No   Health Literacy:  Adequate Health Literacy (2/12/2025)     Health Literacy     Frequency of need for help with medical instructions: Never   Social Isolation: Not on file            OBJECTIVE:     Physical Exam:  Vitals:    Physical Exam  Constitutional:       General: He is not in acute distress.     Appearance: Normal appearance. He is not ill-appearing or toxic-appearing.   Pulmonary:      Effort: Pulmonary effort is normal. No respiratory distress.   Musculoskeletal:      Cervical back: Normal range of motion.   Skin:     Coloration: Skin is not jaundiced or pale.   Neurological:      Mental Status: He is alert and oriented to person, place, and time.   Psychiatric:         Mood and Affect: Mood normal.         Behavior: Behavior normal.         Thought Content: Thought content normal.         Judgment: Judgment normal.           Review of Symptoms      Symptom Assessment (ESAS 0-10 Scale)  Pain:  0  Dyspnea:  0  Anxiety:  0  Nausea:  0  Depression:  0  Anorexia:  0  Fatigue:  0  Insomnia:  0  Restlessness:  0  Agitation:  0       Anxiety:  Is not nervous/anxious    ECOG Performance Status stGstrstastdstest:st st1st Living Arrangements:  Lives alone    Psychosocial/Cultural:   See Palliative Psychosocial Note: Yes  **Primary  to Follow**  Palliative Care  Consult: Yes     Time-Based Charting:  Yes  Chart Review: 20 minutes  Face to Face: 35 minutes    Total Time Spent: 55 minutes    Advance Care Planning   Advance Directives:     Decision Making:  Patient answered questions  Goals of Care: The patient endorses that what is most important right now is to focus on symptom/pain control    Accordingly, we have decided that the best plan to meet the patient's goals includes continuing with treatment              Medications:  Current Medications[1]    Labs:  CBC:   WBC   Date Value Ref Range Status   08/04/2025 5.81 3.90 - 12.70 K/uL Final     Hemoglobin   Date Value Ref Range Status   03/11/2025 13.8 (L) 14.0 - 18.0  g/dL Final     HGB   Date Value Ref Range Status   08/04/2025 12.9 (L) 14.0 - 18.0 gm/dL Final     Hematocrit   Date Value Ref Range Status   03/11/2025 40.7 40.0 - 54.0 % Final     HCT   Date Value Ref Range Status   08/04/2025 37.2 (L) 40.0 - 54.0 % Final     MCV   Date Value Ref Range Status   08/04/2025 89 82 - 98 fL Final   03/11/2025 90 82 - 98 fL Final     Platelet Count   Date Value Ref Range Status   08/04/2025 186 150 - 450 K/uL Final     Platelets   Date Value Ref Range Status   03/11/2025 320 150 - 450 K/uL Final       LFT:   Lab Results   Component Value Date    AST 20 08/04/2025    ALKPHOS 30 (L) 08/04/2025    BILITOT 0.5 08/04/2025       Albumin:   Albumin   Date Value Ref Range Status   08/04/2025 3.7 3.5 - 5.2 g/dL Final   03/11/2025 3.6 3.5 - 5.2 g/dL Final     Protein:   Protein Total   Date Value Ref Range Status   08/04/2025 6.5 6.0 - 8.4 gm/dL Final     Total Protein   Date Value Ref Range Status   03/11/2025 6.9 6.0 - 8.4 g/dL Final       Radiology:I have reviewed all pertinent imaging results/findings within the past 24 hours.  PET CT 5/2/25:  FINDINGS:  Head and Neck:     Brain demonstrates normal metabolism. Physiologic uptake is in the neck.     Chest:     No PET avid pulmonary lesions are present. No enlarged or PET avid lymph nodes are in the chest.     Abdomen and Pelvis:     Physiologic uptake is in the liver, spleen, urinary system and bowel. No enlarged or PET avid lymph nodes are in the abdomen or pelvis. Adrenal glands are normal.  Within the left aspect of the prostate gland a hypermetabolic lesions identified with maximum SUV of 16.0 on image number [id number].  This is significantly decreased compared to the prior examination.  Previous scribe pelvic sidewall lymphadenopathy is significant resolved with a residual right-sided lymph node on image number [id number] with maximum SUV of 9.7.  This measures 7 mm in short dimension.  Ureteral activity is identified within the retroperitoneum more  prominent on the right than left.     Musculoskeletal:     Known sclerotic lesion of the right pubic symphysis demonstrates interval sclerosis and decreased degree of hypermetabolic activity.  Residual FDG avidity is identified with maximum SUV of 5.5.  Previous scribe hypermetabolic activity of the inferior pubic ramus on the right is also significantly improved.  No new suspicious lesions identified..     Impression:     1. Interval improvement with therapy.  2. Residual hypermetabolic activity is identified within the midline/left aspect of the prostate in significantly improved compared to the prior examination.  3. Near complete resolution of the pelvic sidewall adenopathy with small right pelvic sidewall lymph node demonstrating low level hypermetabolic activity.  4. Improved osseous metastatic disease especially of the right pubic symphysis and inferior pubic ramus.  5. No new focus of disease.    55 minutes of total time spent on the encounter, which includes face to face time and non-face to face time preparing to see the patient (eg, review of tests), Obtaining and/or reviewing separately obtained history, Documenting clinical information in the electronic or other health record, Independently interpreting results if documented above (not separately reported) and communicating results to the patient/family/caregiver, or Care coordination (not separately reported).    The patient location is: [place]  The chief complaint leading to consultation is: symptom management    Visit type: audiovisual    Face to Face time with patient: 35 minutes  55 minutes of total time spent on the encounter, which includes face to face time and non-face to face time preparing to see the patient (eg, review of tests), Obtaining and/or reviewing separately obtained history, Documenting clinical information in the electronic or other health record, Independently interpreting results (not separately reported) and communicating  results to the patient/family/caregiver, or Care coordination (not separately reported).         Each patient to whom he or she provides medical services by telemedicine is:  (1) informed of the relationship between the physician and patient and the respective role of any other health care provider with respect to management of the patient; and (2) notified that he or she may decline to receive medical services by telemedicine and may withdraw from such care at any time.    Notes:       Signature: Radha Coronel DO    Portions of this note may have been created with voice recognition software.  Grammatical, syntax and spelling errors may be inevitable.          [1]   Current Outpatient Medications:     abiraterone (ZYTIGA) 500 mg Tab, Take 2 tablets (1,000 mg total) by mouth once daily., Disp: 60 tablet, Rfl: 6    acetaminophen (TYLENOL) 500 MG tablet, Take 500 mg by mouth 2 (two) times a day., Disp: , Rfl:     calcium carbonate (OS-GORDON) 500 mg calcium (1,250 mg) tablet, Take 1 tablet by mouth 2 (two) times daily., Disp: , Rfl:     celecoxib (CELEBREX) 100 MG capsule, TAKE 1 CAPSULE(100 MG) BY MOUTH TWICE DAILY AS NEEDED FOR PAIN, Disp: 180 capsule, Rfl: 0    cholecalciferol, vitamin D3, (VITAMIN D3) 10 mcg (400 unit) Tab, Take by mouth 2 (two) times a day., Disp: , Rfl:     predniSONE (DELTASONE) 5 MG tablet, Take 1 tablet (5 mg total) by mouth once daily., Disp: 30 tablet, Rfl: 6    tamsulosin (FLOMAX) 0.4 mg Cap, Take 1 capsule (0.4 mg total) by mouth once daily., Disp: 90 capsule, Rfl: 0  No current facility-administered medications for this visit.

## 2025-08-07 ENCOUNTER — OFFICE VISIT (OUTPATIENT)
Dept: HEMATOLOGY/ONCOLOGY | Facility: CLINIC | Age: 60
End: 2025-08-07
Payer: MEDICARE

## 2025-08-07 ENCOUNTER — INFUSION (OUTPATIENT)
Dept: INFUSION THERAPY | Facility: HOSPITAL | Age: 60
End: 2025-08-07
Attending: INTERNAL MEDICINE
Payer: MEDICARE

## 2025-08-07 VITALS
BODY MASS INDEX: 23.94 KG/M2 | HEART RATE: 57 BPM | TEMPERATURE: 98 F | WEIGHT: 161.63 LBS | DIASTOLIC BLOOD PRESSURE: 76 MMHG | RESPIRATION RATE: 19 BRPM | HEIGHT: 69 IN | SYSTOLIC BLOOD PRESSURE: 131 MMHG | OXYGEN SATURATION: 97 %

## 2025-08-07 VITALS
RESPIRATION RATE: 19 BRPM | OXYGEN SATURATION: 97 % | BODY MASS INDEX: 23.94 KG/M2 | DIASTOLIC BLOOD PRESSURE: 76 MMHG | WEIGHT: 161.63 LBS | HEART RATE: 57 BPM | TEMPERATURE: 98 F | SYSTOLIC BLOOD PRESSURE: 131 MMHG | HEIGHT: 69 IN

## 2025-08-07 DIAGNOSIS — D64.9 NORMOCYTIC ANEMIA: ICD-10-CM

## 2025-08-07 DIAGNOSIS — C61 PROSTATE CANCER: Primary | ICD-10-CM

## 2025-08-07 DIAGNOSIS — C79.51 METASTASIS TO BONE: ICD-10-CM

## 2025-08-07 PROCEDURE — 96402 CHEMO HORMON ANTINEOPL SQ/IM: CPT | Mod: PN

## 2025-08-07 PROCEDURE — 96372 THER/PROPH/DIAG INJ SC/IM: CPT | Mod: 59,PN

## 2025-08-07 PROCEDURE — 99999 PR PBB SHADOW E&M-EST. PATIENT-LVL IV: CPT | Mod: PBBFAC,,,

## 2025-08-07 PROCEDURE — 99214 OFFICE O/P EST MOD 30 MIN: CPT | Mod: PBBFAC,PN

## 2025-08-07 PROCEDURE — 63600175 PHARM REV CODE 636 W HCPCS: Mod: JZ,TB,PN

## 2025-08-07 RX ADMIN — LEUPROLIDE ACETATE 22.5 MG: KIT at 11:08

## 2025-08-07 RX ADMIN — DENOSUMAB 120 MG: 120 INJECTION SUBCUTANEOUS at 11:08

## 2025-08-07 NOTE — PLAN OF CARE
Pt arrived to clinic today for Lupron injection and Xgeva injection and tolerated well. No changes throughout therapy. Pt aware of follow up appointments and side effects of drugs. Discharged to home. NAD.

## 2025-08-11 PROBLEM — R53.83 FATIGUE: Status: RESOLVED | Noted: 2025-06-24 | Resolved: 2025-08-11

## 2025-08-21 ENCOUNTER — OFFICE VISIT (OUTPATIENT)
Dept: PALLIATIVE MEDICINE | Facility: CLINIC | Age: 60
End: 2025-08-21
Payer: MEDICARE

## 2025-08-21 DIAGNOSIS — C61 PROSTATE CANCER: ICD-10-CM

## 2025-08-21 DIAGNOSIS — G89.3 CANCER RELATED PAIN: ICD-10-CM

## 2025-08-21 DIAGNOSIS — Z51.5 PALLIATIVE CARE BY SPECIALIST: Primary | ICD-10-CM

## 2025-08-21 PROCEDURE — 98005 SYNCH AUDIO-VIDEO EST LOW 20: CPT | Mod: 95,,, | Performed by: STUDENT IN AN ORGANIZED HEALTH CARE EDUCATION/TRAINING PROGRAM

## 2025-08-27 DIAGNOSIS — C61 PROSTATE CANCER: ICD-10-CM

## 2025-08-27 RX ORDER — TAMSULOSIN HYDROCHLORIDE 0.4 MG/1
0.4 CAPSULE ORAL DAILY
Qty: 90 CAPSULE | Refills: 0 | Status: SHIPPED | OUTPATIENT
Start: 2025-08-27

## 2025-09-02 ENCOUNTER — OFFICE VISIT (OUTPATIENT)
Dept: PALLIATIVE MEDICINE | Facility: CLINIC | Age: 60
End: 2025-09-02
Payer: MEDICARE

## 2025-09-02 DIAGNOSIS — G89.3 CANCER RELATED PAIN: ICD-10-CM

## 2025-09-02 DIAGNOSIS — Z51.5 PALLIATIVE CARE BY SPECIALIST: Primary | ICD-10-CM

## 2025-09-02 DIAGNOSIS — C61 PROSTATE CANCER: ICD-10-CM

## 2025-09-02 RX ORDER — TRAMADOL HYDROCHLORIDE 50 MG/1
50 TABLET, FILM COATED ORAL EVERY 8 HOURS PRN
Qty: 60 TABLET | Refills: 0 | Status: SHIPPED | OUTPATIENT
Start: 2025-09-02